# Patient Record
Sex: MALE | Race: WHITE | NOT HISPANIC OR LATINO | Employment: FULL TIME | URBAN - METROPOLITAN AREA
[De-identification: names, ages, dates, MRNs, and addresses within clinical notes are randomized per-mention and may not be internally consistent; named-entity substitution may affect disease eponyms.]

---

## 2018-10-04 ENCOUNTER — OFFICE VISIT (OUTPATIENT)
Dept: FAMILY MEDICINE | Facility: CLINIC | Age: 28
End: 2018-10-04
Payer: COMMERCIAL

## 2018-10-04 VITALS
TEMPERATURE: 97.9 F | DIASTOLIC BLOOD PRESSURE: 88 MMHG | WEIGHT: 130.6 LBS | SYSTOLIC BLOOD PRESSURE: 127 MMHG | RESPIRATION RATE: 16 BRPM | HEART RATE: 71 BPM | BODY MASS INDEX: 17.69 KG/M2 | HEIGHT: 72 IN | OXYGEN SATURATION: 100 %

## 2018-10-04 DIAGNOSIS — Z23 NEED FOR PROPHYLACTIC VACCINATION AND INOCULATION AGAINST INFLUENZA: ICD-10-CM

## 2018-10-04 DIAGNOSIS — Z23 NEED FOR TETANUS BOOSTER: ICD-10-CM

## 2018-10-04 DIAGNOSIS — R10.84 ABDOMINAL PAIN, GENERALIZED: Primary | ICD-10-CM

## 2018-10-04 PROCEDURE — 90472 IMMUNIZATION ADMIN EACH ADD: CPT | Performed by: FAMILY MEDICINE

## 2018-10-04 PROCEDURE — 90715 TDAP VACCINE 7 YRS/> IM: CPT | Performed by: FAMILY MEDICINE

## 2018-10-04 PROCEDURE — 90686 IIV4 VACC NO PRSV 0.5 ML IM: CPT | Performed by: FAMILY MEDICINE

## 2018-10-04 PROCEDURE — 90471 IMMUNIZATION ADMIN: CPT | Performed by: FAMILY MEDICINE

## 2018-10-04 PROCEDURE — 99203 OFFICE O/P NEW LOW 30 MIN: CPT | Mod: 25 | Performed by: FAMILY MEDICINE

## 2018-10-04 ASSESSMENT — ANXIETY QUESTIONNAIRES
2. NOT BEING ABLE TO STOP OR CONTROL WORRYING: NOT AT ALL
6. BECOMING EASILY ANNOYED OR IRRITABLE: MORE THAN HALF THE DAYS
3. WORRYING TOO MUCH ABOUT DIFFERENT THINGS: NOT AT ALL
7. FEELING AFRAID AS IF SOMETHING AWFUL MIGHT HAPPEN: SEVERAL DAYS
1. FEELING NERVOUS, ANXIOUS, OR ON EDGE: SEVERAL DAYS
GAD7 TOTAL SCORE: 5
5. BEING SO RESTLESS THAT IT IS HARD TO SIT STILL: SEVERAL DAYS
IF YOU CHECKED OFF ANY PROBLEMS ON THIS QUESTIONNAIRE, HOW DIFFICULT HAVE THESE PROBLEMS MADE IT FOR YOU TO DO YOUR WORK, TAKE CARE OF THINGS AT HOME, OR GET ALONG WITH OTHER PEOPLE: NOT DIFFICULT AT ALL

## 2018-10-04 ASSESSMENT — PATIENT HEALTH QUESTIONNAIRE - PHQ9: 5. POOR APPETITE OR OVEREATING: NOT AT ALL

## 2018-10-04 NOTE — PROGRESS NOTES
SUBJECTIVE:   Jonatan Worley is a 27 year old male who presents to clinic today for the following health issues:      ABDOMINAL PAIN     Onset: 4 days     Description:   Character: Dull ache and Cramping  Location: right lower quadrant left lower quadrant gal-umbilical region  Radiation: None    Intensity: moderate, 5/10    Progression of Symptoms:  same    Accompanying Signs & Symptoms:  Fever/Chills?: no   Gas/Bloating: YES  Nausea: YES  Vomitting: no   Diarrhea?: YES- 2-3.   Constipation:no   Dysuria or Hematuria: no    History:   Trauma: no   Previous similar pain: YES years ago had constipation problem   Previous tests done: none    Precipitating factors:   Does the pain change with:     Food: YES     BM: no     Urination: no     Alleviating factors:none       Therapies Tried and outcome:     LMP:  not applicable           Problem list and histories reviewed & adjusted, as indicated.  Additional history: as documented    Patient Active Problem List   Diagnosis     Urinary incontinence     Headache     Attention deficit disorder     Abdominal pain, generalized     History reviewed. No pertinent surgical history.    Social History   Substance Use Topics     Smoking status: Current Every Day Smoker     Packs/day: 0.50     Years: 1.00     Types: Cigarettes     Smokeless tobacco: Never Used      Comment: mom smokes too     Alcohol use No     History reviewed. No pertinent family history.        Reviewed and updated as needed this visit by clinical staff       Reviewed and updated as needed this visit by Provider         ROS:  Constitutional, HEENT,  pulmonary, gi and gu systems are negative, except as otherwise noted.    OBJECTIVE:     /88 (BP Location: Left arm, Patient Position: Chair, Cuff Size: Adult Regular)  Pulse 71  Temp 97.9  F (36.6  C) (Oral)  Resp 16  Ht 6' (1.829 m)  Wt 130 lb 9.6 oz (59.2 kg)  SpO2 100%  BMI 17.71 kg/m2  Body mass index is 17.71 kg/(m^2).  GENERAL: healthy, alert and  no distress  RESP: lungs clear to auscultation - no rales, rhonchi or wheezes  CV: regular rate and rhythm, normal S1 S2,  ABDOMEN: soft, bowel sounds normal, mild generalized soreness, no rebound tenderness or guarding   PSYCH: mentation appears normal and affect normal/bright    Diagnostic Test Results:  none     ASSESSMENT/PLAN:         1. Abdominal pain, generalized  - likely viral  - CBC with platelets; Future  - Basic metabolic panel; Future    2. Need for tetanus booster  - TDAP VACCINE (ADACEL)    3. Need for prophylactic vaccination and inoculation against influenza  - FLU VACCINE, SPLIT VIRUS, IM (QUADRIVALENT) [57228]- >3 YRS  - Vaccine Administration, Initial [84453]    See Patient Instructions    Jasmin Venegas MD  [facility]

## 2018-10-04 NOTE — LETTER
October 4, 2018      Jonatan Worley  [address] [facility] [address]        To Whom It May Concern:    Jonatan Worley  was seen on 10/4/18.  Please excuse him  until 10/5/18 due to illness.        Sincerely,          Dr. Theo MD

## 2018-10-04 NOTE — MR AVS SNAPSHOT
After Visit Summary   10/4/2018    Jonatan Worley    MRN: [id number]           Patient Information     Date Of Birth          1990        Visit Information        Provider Department      10/4/2018 9:00 AM Jasmin Venegas MD [facility]        Today's Diagnoses     Abdominal pain, generalized    -  1      Care Instructions      Recommend bland food for now  Increase fluids  Tylenol/ibuprofen as needed for pain   Follow up in 1 week or sooner if needed   Noninfectious Gastroenteritis (Adult)    Gastroenteritis can cause nausea, vomiting, diarrhea, and abdominal cramping. This may occur as a result of food sensitivity, inflammation of your gastrointestinal tract, medicines, stress, or other causes not related to infection. Your symptoms will usually last from 1 to 3 days, but can last longer. Antibiotics are not effective, but simple home treatment will be helpful.  Home care  Medicine    You may use acetaminophen or NSAID medicines like ibuprofen or naproxen to control fever, unless another medicine is prescribed. (Note: If you have chronic liver or kidney disease, or ever had a stomach ulcer or gastrointestinalI bleeding, talk with your healthcare provider before using these medicines.) Aspirin should never be used in anyone under 18 years of age who is ill with a fever. It may cause severe liver damage. Don't increase your NSAID medicines if you are already taking these medicines for another condition (like arthritis). Don't use NSAIDS if you are on aspirin (such as for heart disease, or after a stroke).    If medicines for diarrhea or vomiting are prescribed, take only as directed.  General care and preventing spread of the illness    If symptoms are severe, rest at home for the next 24 hours or until you feel better.    Hand washing with soap and water is the best way to prevent the spread of infection. Wash your hands after touching anyone who is sick.    Wash  your hands after using the toilet and before meals. Clean the toilet after each use.    Caffeine, tobacco, and alcohol can make your diarrhea, cramping, and pain worse.  Diet    Water and clear liquids are important so you do not get dehydrated. Drink a small amount at a time.    Do not force yourself to eat, especially if you have cramps, vomiting, or diarrhea. When you finally decide to start eating, do not eat large amounts at a time, even if you are hungry.    If you eat, avoid fatty, greasy, spicy, or fried foods.    Do not eat dairy products if you have diarrhea; they can make the diarrhea worse.  During the first 24 hours (the first full day), follow the diet below:    Beverages: Water, clear liquids, soft drinks without caffeine, like ginger ale; mineral water (plain or flavored); decaffeinated tea and coffee.    Soups: Clear broth, consommé, and bouillon Sports drinks aren't a good choice because they have too much sugar and not enough electrolytes. In this case, commercially available products called oral rehydration solutions are best.    Desserts: Plain gelatin, popsicles, and fruit juice bars.  During the next 24 hours (the second day), you may add the following to the above if you have improved. If not, continue what you did the first day:    Hot cereal, plain toast, bread, rolls, crackers    Plain noodles, rice, mashed potatoes, chicken noodle or rice soup    Unsweetened canned fruit (avoid pineapple), bananas    Limit caffeine and chocolate. No spices or seasonings except salt.  During the next 24 hours    Gradually resume a normal diet, as you feel better and your symptoms improve.    If at any time your symptoms start getting worse, go back to clear liquids until you feel better.  Food preparation    If you have diarrhea, you should not prepare food for others. When you  prepare food for yourself, wash your hands before and after.    Wash your hands after using cutting boards, countertops, and  knives that have been in contact with raw food.    Keep uncooked meats away from cooked and ready-to-eat foods.  Follow-up care  Follow up with your healthcare provider if you are not improving over the next 2 to 3 days, or as advised. If a stool (diarrhea) sample was taken, call for the results as directed.  When to seek medical advice  Call your healthcare provider right away if any of these occur:     Increasing abdominal pain or constant lower right abdominal pain    Continued vomiting (unable to keep liquids down)    Frequent diarrhea (more than 5 times a day)    Blood in vomit or stool (black or red color)    Inability to tolerate solid food after a few days.    Dark urine, reduced urine output    Weakness, dizziness    Drowsiness    Fever of 100.4 F (38.0 C) or higher, or as directed by your healthcare provider    New rash  Call 911  Call 911 if any of these occur:    Trouble breathing    Chest pain    Confusion    Severe drowsiness or trouble awakening    Seizure    Stiff neck  Date Last Reviewed: 11/16/2015 2000-2017 [employer]. [address] [place] [address]. All rights reserved. This information is not intended as a substitute for professional medical care. Always follow your healthcare professional's instructions.                Follow-ups after your visit        Follow-up notes from your care team     Return in about 1 week (around 10/11/2018).      Who to contact     If you have questions or need follow up information about today's clinic visit or your schedule please contact [facility] directly at [phone].  Normal or non-critical lab and imaging results will be communicated to you by MyChart, letter or phone within 4 business days after the clinic has received the results. If you do not hear from us within 7 days, please contact the clinic through MyChart or phone. If you have a critical or abnormal lab result, we will notify you by phone as soon as  "possible.  Submit refill requests through [employer] or call your pharmacy and they will forward the refill request to us. Please allow 3 business days for your refill to be completed.          Additional Information About Your Visit        [employer] Information     [employer] lets you send messages to your doctor, view your test results, renew your prescriptions, schedule appointments and more. To sign up, go to [email or web address] . Click on \"Log in\" on the left side of the screen, which will take you to the Welcome page. Then click on \"Sign up Now\" on the right side of the page.     You will be asked to enter the access code listed below, as well as some personal information. Please follow the directions to create your username and password.     Your access code is: [email or web address]  Expires: 2019  9:28 AM     Your access code will  in 90 days. If you need help or a new code, please call your [facility] clinic or [phone].        Care EveryWhere ID     This is your Care EveryWhere ID. This could be used by other organizations to access your [facility] medical records  [id number]        Your Vitals Were     Pulse Temperature Respirations Height Pulse Oximetry BMI (Body Mass Index)    71 97.9  F (36.6  C) (Oral) 16 6' (1.829 m) 100% 17.71 kg/m2       Blood Pressure from Last 3 Encounters:   10/04/18 127/88   11 112/76   11 110/60    Weight from Last 3 Encounters:   10/04/18 130 lb 9.6 oz (59.2 kg)   11 134 lb 14.4 oz (61.2 kg)   11 136 lb 8 oz (61.9 kg)              We Performed the Following     Basic metabolic panel     CBC with platelets        Primary Care Provider Office Phone # Fax #    Puneet Acosta [phone]       [address]  [address] [place] [id number]        Equal Access to Services     PRUDENCE ALBERT : Rigoberto fuller Soteto, waaxda luqadaha, qaybta jatin bradley. So [facility] " [phone].    ATENCIÓN: Si habla rowan, tiene a lau disposición servicios gratuitos de asistencia lingüística. Morenita al [phone].    We comply with applicable federal civil rights laws and [place] laws. We do not discriminate on the basis of race, color, national origin, age, disability, sex, sexual orientation, or gender identity.            Thank you!     Thank you for choosing [facility]  for your care. Our goal is always to provide you with excellent care. Hearing back from our patients is one way we can continue to improve our services. Please take a few minutes to complete the written survey that you may receive in the mail after your visit with us. Thank you!             Your Updated Medication List - Protect others around you: Learn how to safely use, store and throw away your medicines at www.disposemymeds.org.          This list is accurate as of 10/4/18  9:28 AM.  Always use your most recent med list.                   Brand Name Dispense Instructions for use Diagnosis    NO ACTIVE MEDICATIONS

## 2018-10-04 NOTE — PATIENT INSTRUCTIONS
Recommend bland food for now  Increase fluids  Tylenol/ibuprofen as needed for pain   Follow up in 1 week or sooner if needed   Noninfectious Gastroenteritis (Adult)    Gastroenteritis can cause nausea, vomiting, diarrhea, and abdominal cramping. This may occur as a result of food sensitivity, inflammation of your gastrointestinal tract, medicines, stress, or other causes not related to infection. Your symptoms will usually last from 1 to 3 days, but can last longer. Antibiotics are not effective, but simple home treatment will be helpful.  Home care  Medicine    You may use acetaminophen or NSAID medicines like ibuprofen or naproxen to control fever, unless another medicine is prescribed. (Note: If you have chronic liver or kidney disease, or ever had a stomach ulcer or gastrointestinalI bleeding, talk with your healthcare provider before using these medicines.) Aspirin should never be used in anyone under 18 years of age who is ill with a fever. It may cause severe liver damage. Don't increase your NSAID medicines if you are already taking these medicines for another condition (like arthritis). Don't use NSAIDS if you are on aspirin (such as for heart disease, or after a stroke).    If medicines for diarrhea or vomiting are prescribed, take only as directed.  General care and preventing spread of the illness    If symptoms are severe, rest at home for the next 24 hours or until you feel better.    Hand washing with soap and water is the best way to prevent the spread of infection. Wash your hands after touching anyone who is sick.    Wash your hands after using the toilet and before meals. Clean the toilet after each use.    Caffeine, tobacco, and alcohol can make your diarrhea, cramping, and pain worse.  Diet    Water and clear liquids are important so you do not get dehydrated. Drink a small amount at a time.    Do not force yourself to eat, especially if you have cramps, vomiting, or diarrhea. When you  finally decide to start eating, do not eat large amounts at a time, even if you are hungry.    If you eat, avoid fatty, greasy, spicy, or fried foods.    Do not eat dairy products if you have diarrhea; they can make the diarrhea worse.  During the first 24 hours (the first full day), follow the diet below:    Beverages: Water, clear liquids, soft drinks without caffeine, like ginger ale; mineral water (plain or flavored); decaffeinated tea and coffee.    Soups: Clear broth, consommé, and bouillon Sports drinks aren't a good choice because they have too much sugar and not enough electrolytes. In this case, commercially available products called oral rehydration solutions are best.    Desserts: Plain gelatin, popsicles, and fruit juice bars.  During the next 24 hours (the second day), you may add the following to the above if you have improved. If not, continue what you did the first day:    Hot cereal, plain toast, bread, rolls, crackers    Plain noodles, rice, mashed potatoes, chicken noodle or rice soup    Unsweetened canned fruit (avoid pineapple), bananas    Limit caffeine and chocolate. No spices or seasonings except salt.  During the next 24 hours    Gradually resume a normal diet, as you feel better and your symptoms improve.    If at any time your symptoms start getting worse, go back to clear liquids until you feel better.  Food preparation    If you have diarrhea, you should not prepare food for others. When you  prepare food for yourself, wash your hands before and after.    Wash your hands after using cutting boards, countertops, and knives that have been in contact with raw food.    Keep uncooked meats away from cooked and ready-to-eat foods.  Follow-up care  Follow up with your healthcare provider if you are not improving over the next 2 to 3 days, or as advised. If a stool (diarrhea) sample was taken, call for the results as directed.  When to seek medical advice  Call your healthcare provider right  away if any of these occur:     Increasing abdominal pain or constant lower right abdominal pain    Continued vomiting (unable to keep liquids down)    Frequent diarrhea (more than 5 times a day)    Blood in vomit or stool (black or red color)    Inability to tolerate solid food after a few days.    Dark urine, reduced urine output    Weakness, dizziness    Drowsiness    Fever of 100.4 F (38.0 C) or higher, or as directed by your healthcare provider    New rash  Call 911  Call 911 if any of these occur:    Trouble breathing    Chest pain    Confusion    Severe drowsiness or trouble awakening    Seizure    Stiff neck  Date Last Reviewed: 11/16/2015 2000-2017 [employer]. [address] [place] [address], [place] [address]. All rights reserved. This information is not intended as a substitute for professional medical care. Always follow your healthcare professional's instructions.

## 2018-10-04 NOTE — PROGRESS NOTES

## 2018-10-05 ASSESSMENT — ANXIETY QUESTIONNAIRES: GAD7 TOTAL SCORE: 5

## 2018-10-05 ASSESSMENT — PATIENT HEALTH QUESTIONNAIRE - PHQ9: SUM OF ALL RESPONSES TO PHQ QUESTIONS 1-9: 4

## 2019-07-28 ENCOUNTER — OFFICE VISIT (OUTPATIENT)
Dept: URGENT CARE | Facility: URGENT CARE | Age: 29
End: 2019-07-28
Payer: COMMERCIAL

## 2019-07-28 ENCOUNTER — ANCILLARY PROCEDURE (OUTPATIENT)
Dept: GENERAL RADIOLOGY | Facility: CLINIC | Age: 29
End: 2019-07-28
Attending: FAMILY MEDICINE
Payer: COMMERCIAL

## 2019-07-28 VITALS
HEIGHT: 71 IN | RESPIRATION RATE: 16 BRPM | TEMPERATURE: 98.6 F | WEIGHT: 135.4 LBS | HEART RATE: 78 BPM | BODY MASS INDEX: 18.96 KG/M2 | SYSTOLIC BLOOD PRESSURE: 121 MMHG | OXYGEN SATURATION: 98 % | DIASTOLIC BLOOD PRESSURE: 90 MMHG

## 2019-07-28 DIAGNOSIS — R10.13 ABDOMINAL PAIN, EPIGASTRIC: Primary | ICD-10-CM

## 2019-07-28 LAB
ALBUMIN SERPL-MCNC: 4.4 G/DL (ref 3.4–5)
ALBUMIN UR-MCNC: NEGATIVE MG/DL
ALP SERPL-CCNC: 86 U/L (ref 40–150)
ALT SERPL W P-5'-P-CCNC: 31 U/L (ref 0–70)
ANION GAP SERPL CALCULATED.3IONS-SCNC: 6 MMOL/L (ref 3–14)
APPEARANCE UR: CLEAR
AST SERPL W P-5'-P-CCNC: 19 U/L (ref 0–45)
BASOPHILS # BLD AUTO: 0 10E9/L (ref 0–0.2)
BASOPHILS NFR BLD AUTO: 0.4 %
BILIRUB SERPL-MCNC: 2 MG/DL (ref 0.2–1.3)
BILIRUB UR QL STRIP: ABNORMAL
BUN SERPL-MCNC: 14 MG/DL (ref 7–30)
CALCIUM SERPL-MCNC: 9.2 MG/DL (ref 8.5–10.1)
CHLORIDE SERPL-SCNC: 104 MMOL/L (ref 94–109)
CO2 SERPL-SCNC: 29 MMOL/L (ref 20–32)
COLOR UR AUTO: YELLOW
CREAT SERPL-MCNC: 0.71 MG/DL (ref 0.66–1.25)
CRP SERPL-MCNC: <2.9 MG/L (ref 0–8)
DIFFERENTIAL METHOD BLD: NORMAL
EOSINOPHIL # BLD AUTO: 0.1 10E9/L (ref 0–0.7)
EOSINOPHIL NFR BLD AUTO: 1.3 %
ERYTHROCYTE [DISTWIDTH] IN BLOOD BY AUTOMATED COUNT: 13.5 % (ref 10–15)
ERYTHROCYTE [SEDIMENTATION RATE] IN BLOOD BY WESTERGREN METHOD: 1 MM/H (ref 0–15)
GFR SERPL CREATININE-BSD FRML MDRD: >90 ML/MIN/{1.73_M2}
GLUCOSE SERPL-MCNC: 88 MG/DL (ref 70–99)
GLUCOSE UR STRIP-MCNC: NEGATIVE MG/DL
HCT VFR BLD AUTO: 48.4 % (ref 40–53)
HGB BLD-MCNC: 17 G/DL (ref 13.3–17.7)
HGB UR QL STRIP: NEGATIVE
KETONES UR STRIP-MCNC: 15 MG/DL
LEUKOCYTE ESTERASE UR QL STRIP: NEGATIVE
LIPASE SERPL-CCNC: 66 U/L (ref 73–393)
LYMPHOCYTES # BLD AUTO: 2.8 10E9/L (ref 0.8–5.3)
LYMPHOCYTES NFR BLD AUTO: 30.6 %
MCH RBC QN AUTO: 30.3 PG (ref 26.5–33)
MCHC RBC AUTO-ENTMCNC: 35.1 G/DL (ref 31.5–36.5)
MCV RBC AUTO: 86 FL (ref 78–100)
MONOCYTES # BLD AUTO: 0.8 10E9/L (ref 0–1.3)
MONOCYTES NFR BLD AUTO: 9.2 %
NEUTROPHILS # BLD AUTO: 5.3 10E9/L (ref 1.6–8.3)
NEUTROPHILS NFR BLD AUTO: 58.5 %
NITRATE UR QL: NEGATIVE
PH UR STRIP: 7 PH (ref 5–7)
PLATELET # BLD AUTO: 350 10E9/L (ref 150–450)
POTASSIUM SERPL-SCNC: 4.6 MMOL/L (ref 3.4–5.3)
PROT SERPL-MCNC: 7.7 G/DL (ref 6.8–8.8)
RBC # BLD AUTO: 5.61 10E12/L (ref 4.4–5.9)
SODIUM SERPL-SCNC: 139 MMOL/L (ref 133–144)
SOURCE: ABNORMAL
SP GR UR STRIP: 1.02 (ref 1–1.03)
UROBILINOGEN UR STRIP-ACNC: 4 EU/DL (ref 0.2–1)
WBC # BLD AUTO: 9.1 10E9/L (ref 4–11)

## 2019-07-28 PROCEDURE — 81003 URINALYSIS AUTO W/O SCOPE: CPT | Performed by: FAMILY MEDICINE

## 2019-07-28 PROCEDURE — 36415 COLL VENOUS BLD VENIPUNCTURE: CPT | Performed by: FAMILY MEDICINE

## 2019-07-28 PROCEDURE — 74019 RADEX ABDOMEN 2 VIEWS: CPT

## 2019-07-28 PROCEDURE — 99214 OFFICE O/P EST MOD 30 MIN: CPT | Performed by: FAMILY MEDICINE

## 2019-07-28 PROCEDURE — 85025 COMPLETE CBC W/AUTO DIFF WBC: CPT | Performed by: FAMILY MEDICINE

## 2019-07-28 PROCEDURE — 85652 RBC SED RATE AUTOMATED: CPT | Performed by: FAMILY MEDICINE

## 2019-07-28 PROCEDURE — 86140 C-REACTIVE PROTEIN: CPT | Performed by: FAMILY MEDICINE

## 2019-07-28 PROCEDURE — 83690 ASSAY OF LIPASE: CPT | Performed by: FAMILY MEDICINE

## 2019-07-28 PROCEDURE — 80053 COMPREHEN METABOLIC PANEL: CPT | Performed by: FAMILY MEDICINE

## 2019-07-28 RX ORDER — ONDANSETRON 4 MG/1
8 TABLET, ORALLY DISINTEGRATING ORAL ONCE
Status: COMPLETED | OUTPATIENT
Start: 2019-07-28 | End: 2019-07-28

## 2019-07-28 RX ORDER — ONDANSETRON 4 MG/1
4 TABLET, FILM COATED ORAL EVERY 8 HOURS PRN
Qty: 20 TABLET | Refills: 0 | Status: SHIPPED | OUTPATIENT
Start: 2019-07-28 | End: 2020-02-03

## 2019-07-28 RX ADMIN — ONDANSETRON 8 MG: 4 TABLET, ORALLY DISINTEGRATING ORAL at 10:47

## 2019-07-28 ASSESSMENT — MIFFLIN-ST. JEOR: SCORE: 1606.3

## 2019-07-28 NOTE — NURSING NOTE
Administrations This Visit     ondansetron (ZOFRAN-ODT) ODT tab 8 mg     Admin Date  07/28/2019 Action  Given Dose  8 mg Route  Oral Administered By  Princess TOMMY Miranda CMA              Clinic Administered Medication Documentation    Oral Medication Documentation    Patient was given Ondansetron (Zofran) . Prior to medication administration, verified patients identity using patient s name and date of birth. Please see MAR and medication order for additional information.     Was entire amount of medication used? Yes     Princess MILAN Miranda CMA

## 2019-07-28 NOTE — PROGRESS NOTES
"SUBJECTIVE:    Chief Complaint   Patient presents with     Abdominal Pain     x 2-3 days- nausea, vomiting and chills. No diarrhea     HPI:  Jonatan Worley is a 28 year old male who presents with the CC of abdominal - epigastric pain.    Pain is located in the epigastric and RUQ area, with radiation to None.  The pain is characterized as aching and cramping,   Pain at worst is a level 5 on a scale of 1-10.   Pain has been present for 2 day(s) and is fluctuating.  EXACERBATING FACTORS: eating  RELIEVING FACTORS: nothing.  ASSOCIATED SX: nausea, vomiting, chills and \"head foggy\".  Patient denies diarrhea, constipation, bloating, melena, hematochezia, dysuria, frequency, fever and myalgias     Last time the patient passed stool- yesterday, formed  Last time the patient urinated- today, no dysuria-  Yesterday urinated about 4 x  Last time the patient was eating/ drinking -today, little    Surgical History :      Appendectomy   No  cholecystectomy    No   Colon or bowel surgery -  No  Diverticulitis history -   No    He reports occasional drinking beer, not daily.  On high consumption days says he drinks 3 beers       History reviewed. No pertinent past medical history.  Patient Active Problem List   Diagnosis     Urinary incontinence     Headache     Attention deficit disorder     Abdominal pain, generalized       ALLERGIES:  No known allergies      Current Outpatient Medications on File Prior to Visit:  NO ACTIVE MEDICATIONS      No current facility-administered medications on file prior to visit.     Social History     Tobacco Use     Smoking status: Current Every Day Smoker     Packs/day: 0.50     Years: 1.00     Pack years: 0.50     Types: Cigarettes     Smokeless tobacco: Never Used     Tobacco comment: mom smokes too   Substance Use Topics     Alcohol use: No       History reviewed. No pertinent family history.      ROS:  CONSTITUTIONAL:NEGATIVE for fever,  Some chills,   INTEGUMENTARY/SKIN: NEGATIVE for " "worrisome rashes,  or lesions  EYES: NEGATIVE for vision changes or irritation  ENT/MOUTH: NEGATIVE for ear, mouth and throat problems  RESP:NEGATIVE for significant cough or SOB    OBJECTIVE:  /90 (BP Location: Right arm, Patient Position: Sitting, Cuff Size: Adult Regular)   Pulse 78   Temp 98.6  F (37  C) (Tympanic)   Resp 16   Ht 1.803 m (5' 11\")   Wt 61.4 kg (135 lb 6.4 oz)   SpO2 98%   BMI 18.88 kg/m    GENERAL APPEARANCE: alert, moderate distress and cooperative  EYES: EOMI,  PERRL, conjunctiva clear  HENT: ear canals and TM's normal.  Nose and mouth without ulcers, erythema or lesions  NECK: supple, nontender, no lymphadenopathy  RESP: lungs clear to auscultation - no rales, rhonchi or wheezes  CV: regular rates and rhythm, normal S1 S2, no murmur noted  ABDOMEN: soft, normal bowel sounds, tenderness moderate epigastric, mild RUQ  NEURO: Normal strength and tone, sensory exam grossly normal,  normal speech and mentation  SKIN: no suspicious lesions or rashes      Labs:    Results for orders placed or performed in visit on 07/28/19   CBC with platelets differential   Result Value Ref Range    WBC 9.1 4.0 - 11.0 10e9/L    RBC Count 5.61 4.4 - 5.9 10e12/L    Hemoglobin 17.0 13.3 - 17.7 g/dL    Hematocrit 48.4 40.0 - 53.0 %    MCV 86 78 - 100 fl    MCH 30.3 26.5 - 33.0 pg    MCHC 35.1 31.5 - 36.5 g/dL    RDW 13.5 10.0 - 15.0 %    Platelet Count 350 150 - 450 10e9/L    Diff Method Automated Method     % Neutrophils 58.5 %    % Lymphocytes 30.6 %    % Monocytes 9.2 %    % Eosinophils 1.3 %    % Basophils 0.4 %    Absolute Neutrophil 5.3 1.6 - 8.3 10e9/L    Absolute Lymphocytes 2.8 0.8 - 5.3 10e9/L    Absolute Monocytes 0.8 0.0 - 1.3 10e9/L    Absolute Eosinophils 0.1 0.0 - 0.7 10e9/L    Absolute Basophils 0.0 0.0 - 0.2 10e9/L   UA reflex to Microscopic and Culture   Result Value Ref Range    Color Urine Yellow     Appearance Urine Clear     Glucose Urine Negative NEG^Negative mg/dL    Bilirubin Urine " Small (A) NEG^Negative    Ketones Urine 15 (A) NEG^Negative mg/dL    Specific Gravity Urine 1.020 1.003 - 1.035    Blood Urine Negative NEG^Negative    pH Urine 7.0 5.0 - 7.0 pH    Protein Albumin Urine Negative NEG^Negative mg/dL    Urobilinogen Urine 4.0 (H) 0.2 - 1.0 EU/dL    Nitrite Urine Negative NEG^Negative    Leukocyte Esterase Urine Negative NEG^Negative    Source Midstream Urine    Erythrocyte sedimentation rate auto   Result Value Ref Range    Sed Rate 1 0 - 15 mm/h           Abdominal x-ray: No obstruction, no free air,  No dilated bowel,  moderate amount of stool   x-ray read by me Keisha Reinoso MD      ASSESSMENT:     Abdominal pain, epigastric     - Comprehensive metabolic panel  - Lipase  - XR Abdomen 2 Views  - CBC with platelets differential  - UA reflex to Microscopic and Culture  - Erythrocyte sedimentation rate auto  - CRP inflammation  - ondansetron (ZOFRAN) 4 MG tablet; Take 1 tablet (4 mg) by mouth every 8 hours as needed for nausea  - ranitidine (ZANTAC) 150 MG tablet; Take 1 tablet (150 mg) by mouth 2 times daily-  discussed that improvement with ranitidine would likely be associated with gastritis, or start of stomach ulcer      We discussed some of the many possible causes of epigastric abdominal pain including appendicitis, cholecystitis, diverticulitis, hepatitis, colitis, gastritis,  A stone in the gallbladder, pancreas, kidney or ureter, constipation, obstructed bowel, kidney or bladder infection, cancers, UTI,   hernias, torsion of an organ,       Discussed that the lab results did not identify the cause of the abdominal pain, but the testing has confirmed that  He/she does not have some important causes of pain  Labs indicate unlikely urinary tract/ kidney infection or stone,  There is unlikely significant intraabdominal infection like appendicitis, cholecystitis or diverticulitis,  no bowel obstruction, no significant constipation.  Testing for inflammation of liver, gallbladder  and pancreas is pending.     Patient was counselled that if the abdominal symptoms worsen, especially with worsening pain, associated fever, chills, and/or vomiting with inability to keep down foods and liquids, blood in the urine, stool or vomitus  that they should be re-evaluated in the Emergency Department.       Given zofran 8 mg in clinic with some improvement of nausea symptoms

## 2019-07-28 NOTE — PATIENT INSTRUCTIONS
Patient Education     Food Poisoning or Viral Gastroenteritis (Adult)  You have a stomach illness that is likely either food poisoning or viral gastroenteritis.  Food poisoning is illness that is passed along in food and affects the stomach and intestinal tract. It usually occurs from 1 to 24 hours after eating food that has spoiled. When it happens within a few hours of eating, it is often caused by toxins from bacteria in food that has not been cooked or refrigerated properly.  Viral gastroenteritis is an illness from a virus that also affects the stomach and intestinal tract. Many people call it the  stomach flu,  but it has nothing to do with influenza. In fact, it can happen from food poisoning, but it can also happen when germs are passed from person-to-person or contaminated surface (toothbrush, cutting board, toilet) to a person.  Either illness can cause these symptoms:    Abdominal pain and cramping    Nausea    Vomiting    Diarrhea    Fever and chills    Loss of bowel control  The symptoms of food poisoning usually last 1 to 2 days. The symptoms of viral gastroenteritis can sometimes last up to 7 days but usually end sooner.  Antibiotics are not effective for either illness.  Other causes of gastroenteritis include bacteria and parasites which are not discussed here.  Home care  Follow all instructions given by your healthcare provider. Rest at home for the next 24 hours, or until you feel better. Avoid caffeine, tobacco, and alcohol. These can make diarrhea, cramping, and pain worse.  If taking medicines:    Over-the-counter diarrhea or nausea medicines are generally OK unless you have bleeding, fever, or severe abdominal pain.    You may use acetaminophen or NSAID medicines like ibuprofen or naproxen to reduce pain and fever. Don t use these if you have chronic liver or kidney disease, or ever had a stomach ulcer or gastrointestinal bleeding. Talk with your healthcare provider first. Don't use NSAID  medicines if you are already taking one for another condition (like arthritis) or are on daily aspirin therapy (such as for heart disease or after a stroke).  To prevent the spread of illness:    Remember that washing with soap and water is the best way to prevent the spread of infection. Wash your hands before and after caring for a sick person. Dry your hands with a single use towel.    Clean the toilet after each use.    Wash your hands or use alcohol-based hand  before eating.    Wash your hands or use alcohol-based hand  before and after preparing food. Keep in mind that people with diarrhea or vomiting should not prepare food for others.    Wash your hands or use alcohol-based hand  after using cutting boards, counter-tops, and knives (and other utensils) that have been in contact with raw foods.    Wash and then peel fruits and vegetables.    Keep uncooked meats away from cooked and ready-to-eat foods.    Use a food thermometer when cooking. Cook poultry to at least 165 F (74 C). Cook ground meat (beef, veal, pork, lamb) to at least 160 F (71 C). Cook fresh beef, veal, lamb, and pork to at least 145 F (63 C).    Don t eat raw or undercooked eggs (poached or kayden side up), poultry, meat or unpasteurized milk and juices.  Food and drinks  The main goal while treating vomiting or diarrhea is to prevent dehydration. This is done by taking small amounts of liquids often.    Keep in mind that liquids are more important than food right now.    Drink only small amounts of liquids at a time.    Don t force yourself to eat, especially if you are having cramping, vomiting, or diarrhea. Don t eat large amounts at a time, even if you are hungry.    If you eat, avoid fatty, greasy, spicy, or fried foods.    Don t eat dairy foods or drink milk if you have diarrhea. These can make diarrhea worse.  The first 24 hours you can try:    Oral rehydration solutions, available at grocery stores and  pharmacies. Sports drinks are not a good choice if you are very dehydrated. They have too much sugar and not enough electrolytes.    Soft drinks without caffeine    Ginger ale    Water (plain or flavored)    Decaf tea or coffee    Clear broth, consommé, or bouillon    Gelatin, ice pops, or frozen fruit juice bars   The second 24 hours, if you are feeling better, you can add:    Hot cereal, plain toast, bread, rolls, or crackers    Plain noodles, rice, mashed potatoes, chicken noodle soup, or rice soup    Unsweetened canned fruit (no pineapple)    Bananas  As you recover:    Limit fat intake to less than 15 grams per day. Don t eat margarine, butter, oils, mayonnaise, sauces, gravies, fried foods, peanut butter, meat, poultry, or fish.    Limit fiber. Don t eat raw or cooked vegetables, fresh fruits except bananas, and bran cereals.    Limit caffeine and chocolate.    Don t use spices or seasonings except salt.    Resume a normal diet over time, as you feel better and your symptoms improve.    If the symptoms come back, go back to a simple diet or clear liquids.  Follow-up care  Follow up with your healthcare provider, or as advised. If a stool sample was taken or cultures were done, call the healthcare provider for the results as instructed.  Call 911  Call 911 if you have any of these symptoms:    Trouble breathing    Confusion    Extreme drowsiness or trouble walking    Loss of consciousness    Rapid heart rate    Chest pain    Stiff neck    Seizure  When to seek medical advice  Call your healthcare provider right away if any of these occur:    Abdominal pain that gets worse    Constant lower right abdominal pain    Continued vomiting and inability to keep liquids down    Diarrhea more than 5 times a day    Blood in vomit or stool    Dark urine or no urine for 8 hours, dry mouth and tongue, tiredness, weakness, or dizziness    New rash    You don t get better in 2 to 3 days    Fever of 100.4 F (38 C) or higher, or  as directed by your healthcare provider    You have new symptoms of arthritis  Date Last Reviewed: 6/1/2018 2000-2018 [employer]. [address] [place] [address], [place] [address]. All rights reserved. This information is not intended as a substitute for professional medical care. Always follow your healthcare professional's instructions.

## 2019-07-28 NOTE — LETTER
[facility] URGENT [facility]  [address]  [facility] [address]  [phone]      July 28, 2019    RE:  Jonatan Worley                                                                                                                                                       [address] [facility] [address]            To whom it may concern:    Jonatan Worley is under my professional care for Abdominal pain, epigastric.    May return to work   with no restrictions when vomiting, abdominal pain,  fevers, chills and weakness have resolved        Sincerely,        Keisha Reinoso MD  [facility]

## 2020-01-08 ENCOUNTER — ANCILLARY PROCEDURE (OUTPATIENT)
Dept: GENERAL RADIOLOGY | Facility: CLINIC | Age: 30
End: 2020-01-08
Attending: PHYSICIAN ASSISTANT
Payer: COMMERCIAL

## 2020-01-08 ENCOUNTER — OFFICE VISIT (OUTPATIENT)
Dept: URGENT CARE | Facility: URGENT CARE | Age: 30
End: 2020-01-08
Payer: COMMERCIAL

## 2020-01-08 VITALS
SYSTOLIC BLOOD PRESSURE: 122 MMHG | TEMPERATURE: 98.2 F | HEART RATE: 74 BPM | DIASTOLIC BLOOD PRESSURE: 80 MMHG | BODY MASS INDEX: 19.94 KG/M2 | OXYGEN SATURATION: 97 % | WEIGHT: 143 LBS

## 2020-01-08 DIAGNOSIS — R07.81 RIB PAIN ON LEFT SIDE: ICD-10-CM

## 2020-01-08 DIAGNOSIS — R07.81 RIB PAIN ON LEFT SIDE: Primary | ICD-10-CM

## 2020-01-08 PROCEDURE — 99214 OFFICE O/P EST MOD 30 MIN: CPT | Performed by: PHYSICIAN ASSISTANT

## 2020-01-08 PROCEDURE — 71101 X-RAY EXAM UNILAT RIBS/CHEST: CPT | Mod: LT

## 2020-01-08 RX ORDER — CYCLOBENZAPRINE HCL 10 MG
10 TABLET ORAL 3 TIMES DAILY PRN
Qty: 30 TABLET | Refills: 0 | Status: SHIPPED | OUTPATIENT
Start: 2020-01-08 | End: 2020-02-03

## 2020-01-08 NOTE — PATIENT INSTRUCTIONS
Ibuprofen  800mg three times a day for 3-5 days      Patient Education     Chest Wall Pain: Costochondritis    The chest pain that you have had today is caused by costochondritis. This condition is caused by an inflammation of the cartilage joining your ribs to your breastbone. It is not caused by heart or lung problems. Your healthcare team has made sure that the chest pain you feel is not from a life threatening cause of chest pain such as heart attack, collapsed lung, blood clot in the lung, tear in the aorta, or esophageal rupture. The inflammation may have been brought on by a blow to the chest, lifting heavy objects, intense exercise, or an illness that made you cough and sneeze a lot. It often occurs during times of emotional stress. It can be painful, but it is not dangerous. It usually goes away in 1 to 2 weeks. But it may happen again. Rarely, a more serious condition may cause symptoms similar to costochondritis. That s why it s important to watch for the warning signs listed below.  Home care  Follow these guidelines when caring for yourself at home:    If you feel that emotional stress is a cause of your condition, try to figure out the sources of that stress. It may not be obvious. Learn ways to deal with the stress in your life. This can include regular exercise, muscle relaxation, meditation, or simply taking time out for yourself.    You may use acetaminophen, ibuprofen, or naproxen to control pain, unless another pain medicine was prescribed. If you have liver or kidney disease or ever had a stomach ulcer, talk with your healthcare provider before using these medicines.    You can also help ease pain by using a hot, wet compress or heating pad. Use this with or without a medicated skin cream that helps relieves pain.    Do stretching exercise as advised by your provider.    Take any prescribed medicines as directed.  Follow-up care  Follow up with your healthcare provider, or as advised, if you do  not start to get better in the next 2 days.  When to seek medical advice  Call your healthcare provider right away if any of these occur:    A change in the type of pain. Call if it feels different, becomes more serious, lasts longer, or spreads into your shoulder, arm, neck, jaw, or back.    Shortness of breath or pain gets worse when you breathe    Weakness, dizziness, or fainting    Cough with dark-colored sputum (phlegm) or blood    Abdominal pain    Dark red or black stools    Fever of 100.4 F (38 C) or higher, or as directed by your healthcare provider  Date Last Reviewed: 12/1/2016 2000-2019 [employer]. [address] [place] [address], [place] [address]. All rights reserved. This information is not intended as a substitute for professional medical care. Always follow your healthcare professional's instructions.

## 2020-01-08 NOTE — PROGRESS NOTES
SUBJECTIVE:  Chief Complaint   Patient presents with     Urgent Care     Chest Pain     Pain on the Left side of upper rib cage- he felt a popping sensation when he was leaning over something last week      Jonatan Worley is a 29 year old male presents with a chief complaint of left rib pain.  The injury occurred 1 week(s) ago.   The injury happened while at home. How: twisting/reaching immediate pain.  The patient complained of moderate pain  and has had decreased ROM.  Pain exacerbated by twisting.  Relieved by rest.  He treated it initially with Ibuprofen. This is the first time this type of injury has occurred to this patient.     No past medical history on file.  Current Outpatient Medications   Medication Sig Dispense Refill     NO ACTIVE MEDICATIONS        ondansetron (ZOFRAN) 4 MG tablet Take 1 tablet (4 mg) by mouth every 8 hours as needed for nausea (Patient not taking: Reported on 1/8/2020) 20 tablet 0     ranitidine (ZANTAC) 150 MG tablet Take 1 tablet (150 mg) by mouth 2 times daily (Patient not taking: Reported on 1/8/2020) 60 tablet 0     Social History     Tobacco Use     Smoking status: Current Every Day Smoker     Packs/day: 0.50     Years: 1.00     Pack years: 0.50     Types: Cigarettes     Smokeless tobacco: Never Used     Tobacco comment: mom smokes too   Substance Use Topics     Alcohol use: No       ROS:  10 point ROS negative except as listed above      EXAM:   /80   Pulse 74   Temp 98.2  F (36.8  C) (Tympanic)   Wt 64.9 kg (143 lb)   SpO2 97%   BMI 19.94 kg/m    Gen: healthy,alert,no distress  RIBS: Pain with palapation, rotation, cross arm pull  CHEST: clear to auscultation  CV: regular rate and rhythm  EXTREMITIES: peripheral pulses normal  MS: no gross deformities noted, no evidence of inflammation in joints, FROM in all extremities.  SKIN: no suspicious lesions or rashes  NEURO: Normal strength and tone, sensory exam grossly normal, mentation intact and speech  normal    X-ray image initially interpreted in clinic by me in order to rule out pneumothorax, fracture, dislocation.  No evidence appreciated.        ASSESSMENT:   (R07.81) Rib pain on left side  (primary encounter diagnosis)  Comment: No evidence of pneumothorax, fracture, dislocation appreciated  Plan: XR Ribs & Chest Left G/E 3 Views,         cyclobenzaprine (FLEXERIL) 10 MG tablet  Rest, Ice, scheduled deep breaths, Ibuprofen    Patient Instructions   Ibuprofen  800mg three times a day for 3-5 days      Patient Education     Chest Wall Pain: Costochondritis    The chest pain that you have had today is caused by costochondritis. This condition is caused by an inflammation of the cartilage joining your ribs to your breastbone. It is not caused by heart or lung problems. Your healthcare team has made sure that the chest pain you feel is not from a life threatening cause of chest pain such as heart attack, collapsed lung, blood clot in the lung, tear in the aorta, or esophageal rupture. The inflammation may have been brought on by a blow to the chest, lifting heavy objects, intense exercise, or an illness that made you cough and sneeze a lot. It often occurs during times of emotional stress. It can be painful, but it is not dangerous. It usually goes away in 1 to 2 weeks. But it may happen again. Rarely, a more serious condition may cause symptoms similar to costochondritis. That s why it s important to watch for the warning signs listed below.  Home care  Follow these guidelines when caring for yourself at home:    If you feel that emotional stress is a cause of your condition, try to figure out the sources of that stress. It may not be obvious. Learn ways to deal with the stress in your life. This can include regular exercise, muscle relaxation, meditation, or simply taking time out for yourself.    You may use acetaminophen, ibuprofen, or naproxen to control pain, unless another pain medicine was prescribed. If  you have liver or kidney disease or ever had a stomach ulcer, talk with your healthcare provider before using these medicines.    You can also help ease pain by using a hot, wet compress or heating pad. Use this with or without a medicated skin cream that helps relieves pain.    Do stretching exercise as advised by your provider.    Take any prescribed medicines as directed.  Follow-up care  Follow up with your healthcare provider, or as advised, if you do not start to get better in the next 2 days.  When to seek medical advice  Call your healthcare provider right away if any of these occur:    A change in the type of pain. Call if it feels different, becomes more serious, lasts longer, or spreads into your shoulder, arm, neck, jaw, or back.    Shortness of breath or pain gets worse when you breathe    Weakness, dizziness, or fainting    Cough with dark-colored sputum (phlegm) or blood    Abdominal pain    Dark red or black stools    Fever of 100.4 F (38 C) or higher, or as directed by your healthcare provider  Date Last Reviewed: 12/1/2016 2000-2019 [employer]. [address] [place] [address], [place] [address]. All rights reserved. This information is not intended as a substitute for professional medical care. Always follow your healthcare professional's instructions.

## 2020-01-08 NOTE — LETTER
[facility] URGENT [facility]  [address]  [address]  [place] [address]  Phone: [phone]  Fax: [phone]    January 8, 2020        Jonatan Worley  [address] [facility] [address]          To whom it may concern:    RE: Jonatan Munroevinodjhon    Patient was seen and treated today at our clinic.  Please excuse absences on 1/5, 1/6, and 1/11.      Please contact me for questions or concerns.      Sincerely,        Juvenal Cole PA-C

## 2020-02-03 ENCOUNTER — OFFICE VISIT (OUTPATIENT)
Dept: URGENT CARE | Facility: URGENT CARE | Age: 30
End: 2020-02-03
Payer: COMMERCIAL

## 2020-02-03 VITALS
SYSTOLIC BLOOD PRESSURE: 120 MMHG | OXYGEN SATURATION: 98 % | HEART RATE: 85 BPM | WEIGHT: 140 LBS | DIASTOLIC BLOOD PRESSURE: 80 MMHG | TEMPERATURE: 97.7 F | BODY MASS INDEX: 19.53 KG/M2

## 2020-02-03 DIAGNOSIS — H65.91 MIDDLE EAR EFFUSION, RIGHT: Primary | ICD-10-CM

## 2020-02-03 PROCEDURE — 99213 OFFICE O/P EST LOW 20 MIN: CPT | Performed by: PHYSICIAN ASSISTANT

## 2020-02-03 RX ORDER — GENTAMICIN SULFATE 3 MG/ML
SOLUTION/ DROPS OPHTHALMIC
COMMUNITY
Start: 2020-02-01

## 2020-02-03 RX ORDER — FLUTICASONE PROPIONATE 50 MCG
1 SPRAY, SUSPENSION (ML) NASAL DAILY
Qty: 16 G | Refills: 0 | Status: SHIPPED | OUTPATIENT
Start: 2020-02-03

## 2020-02-03 ASSESSMENT — PAIN SCALES - GENERAL: PAINLEVEL: NO PAIN (0)

## 2020-02-03 NOTE — PROGRESS NOTES
SUBJECTIVE:  Jonatan Worley is a 29 year old male who presents with right ear muffled for 1 day(s).   Severity: moderate   Timing:sudden onset and still present  Patient works around loud noises and wears ear plugs. He has had this problem in the past. He reports that symptoms in the past and they have resolved without intervention in the past.   Patient denies fever, chills, drainage from ears, tinnitus, pain on the outer ear, recent URI symptoms or recent swimming.       No past medical history on file.  Current Outpatient Medications   Medication Sig Dispense Refill     fluticasone (FLONASE) 50 MCG/ACT nasal spray Spray 1 spray into both nostrils daily 16 g 0     gentamicin (GARAMYCIN) 0.3 % ophthalmic solution PLACE 1 DROP INTO THE RIGHT EYE TID       NO ACTIVE MEDICATIONS        Social History     Tobacco Use     Smoking status: Current Every Day Smoker     Packs/day: 0.50     Years: 1.00     Pack years: 0.50     Types: Cigarettes     Smokeless tobacco: Never Used     Tobacco comment: mom smokes too   Substance Use Topics     Alcohol use: No       ROS:   Review of systems negative except as stated above.    OBJECTIVE:  /80   Pulse 85   Temp 97.7  F (36.5  C) (Tympanic)   Wt 63.5 kg (140 lb)   SpO2 98%   BMI 19.53 kg/m     EXAM:  The right TM is fluid     The right auditory canal is normal and without drainage, edema or erythema  The left TM is normal: no effusions, no erythema, and normal landmarks  The left auditory canal is normal and without drainage, edema or erythema  Oropharynx exam is normal: no lesions, erythema, adenopathy or exudate.  GENERAL: no acute distress  EYES: EOMI,  PERRL, conjunctiva clear  NECK: supple, non-tender to palpation, no adenopathy noted  RESP: lungs clear to auscultation - no rales, rhonchi or wheezes  CV: regular rates and rhythm, normal S1 S2, no murmur noted  SKIN: no suspicious lesions or rashes     ASSESSMENT / PLAN:  1. Middle ear effusion, right  Small amount  of fluid in right ear, wll use flonase  No evidence of AOM. OE or cerumen impaction  Advised to follow-up with audiology if sxs worsen or fail to resolve in one week.   - fluticasone (FLONASE) 50 MCG/ACT nasal spray; Spray 1 spray into both nostrils daily  Dispense: 16 g; Refill: 0    Diagnosis and treatment plan was reviewed with patient and/or family.   We went over any labs or imaging. Discussed worsening symptoms or little to no relief despite treatment plan to follow-up with PCP or return to clinic.  Patient verbalizes understanding. All questions were addressed and answered.   Kavita Alfaro PA-C

## 2021-01-15 ENCOUNTER — HEALTH MAINTENANCE LETTER (OUTPATIENT)
Age: 31
End: 2021-01-15

## 2021-10-24 ENCOUNTER — HEALTH MAINTENANCE LETTER (OUTPATIENT)
Age: 31
End: 2021-10-24

## 2021-11-04 ENCOUNTER — OFFICE VISIT (OUTPATIENT)
Dept: URGENT CARE | Facility: URGENT CARE | Age: 31
End: 2021-11-04
Payer: COMMERCIAL

## 2021-11-04 VITALS
TEMPERATURE: 97.1 F | HEART RATE: 84 BPM | SYSTOLIC BLOOD PRESSURE: 140 MMHG | OXYGEN SATURATION: 99 % | DIASTOLIC BLOOD PRESSURE: 92 MMHG

## 2021-11-04 DIAGNOSIS — R07.0 THROAT PAIN: Primary | ICD-10-CM

## 2021-11-04 LAB
DEPRECATED S PYO AG THROAT QL EIA: NEGATIVE
GROUP A STREP BY PCR: NOT DETECTED

## 2021-11-04 PROCEDURE — U0005 INFEC AGEN DETEC AMPLI PROBE: HCPCS | Performed by: PHYSICIAN ASSISTANT

## 2021-11-04 PROCEDURE — U0003 INFECTIOUS AGENT DETECTION BY NUCLEIC ACID (DNA OR RNA); SEVERE ACUTE RESPIRATORY SYNDROME CORONAVIRUS 2 (SARS-COV-2) (CORONAVIRUS DISEASE [COVID-19]), AMPLIFIED PROBE TECHNIQUE, MAKING USE OF HIGH THROUGHPUT TECHNOLOGIES AS DESCRIBED BY CMS-2020-01-R: HCPCS | Performed by: PHYSICIAN ASSISTANT

## 2021-11-04 PROCEDURE — 99213 OFFICE O/P EST LOW 20 MIN: CPT | Performed by: PHYSICIAN ASSISTANT

## 2021-11-04 PROCEDURE — 87651 STREP A DNA AMP PROBE: CPT | Performed by: PHYSICIAN ASSISTANT

## 2021-11-04 NOTE — PROGRESS NOTES
Assessment/Plan:    No acute distress or toxicity noted. Lungs CTAB, no signs of pneumonia. Strep negative. Suspect viral illness. Supportive treatments discussed.    Discussed that symptoms do overlap with possible COVID-19, and patient was tested for this today. Isolate while awaiting test results.     PPE worn during exam: face shield, surgical mask, gown, & gloves.  See patient instructions below.  At the end of the encounter, I discussed results, diagnosis, medications. Discussed red flags for immediate return to clinic/ER, as well as indications for follow up if no improvement. Patient understood and agreed to plan. Patient was stable for discharge.      ICD-10-CM    1. Throat pain  R07.0 Streptococcus A Rapid Screen w/Reflex to PCR     Group A Streptococcus PCR Throat Swab     Symptomatic COVID-19 Virus (Coronavirus) by PCR Nose         Return in about 1 week (around 11/11/2021) for Follow up w/ primary care provider if not better.    LIDYA England, ROBINA  [facility] [place]    ------------------------------------------------------------------------------------------------------------------------------------------------------------------------  HPI:  Jonatan Worley is a 30 year old male who presents for evaluation of sore throat, rhinorrhea, fatigue and myalgias onset 2 days ago. No treatments tried. Patient reports no fever/chills, cough, loss of sense of taste or smell, headache, chest pain, shortness of breath, abdominal pain, nausea, vomiting, diarrhea, rash, or any other symptoms. No known sick contacts/COVID exposure.       No past medical history on file.    Vitals:    11/04/21 1119   BP: (!) 140/92   Pulse: 84   Temp: 97.1  F (36.2  C)   SpO2: 99%       Physical Exam  Vitals and nursing note reviewed.   HENT:      Right Ear: Tympanic membrane normal.      Left Ear: Tympanic membrane normal.      Mouth/Throat:      Mouth: Mucous membranes are moist.      Pharynx: Uvula  midline. Posterior oropharyngeal erythema present.      Tonsils: No tonsillar exudate or tonsillar abscesses.   Cardiovascular:      Rate and Rhythm: Normal rate and regular rhythm.      Heart sounds: Normal heart sounds.   Pulmonary:      Effort: Pulmonary effort is normal.      Breath sounds: Normal breath sounds.   Neurological:      Mental Status: He is alert.         Labs/Imaging:  Results for orders placed or performed in visit on 11/04/21 (from the past 24 hour(s))   Streptococcus A Rapid Screen w/Reflex to PCR    Specimen: Throat; Swab   Result Value Ref Range    Group A Strep antigen Negative Negative         Patient Instructions     Adult Self-Care for Colds  Colds are caused by viruses. They can t be cured with antibiotics. However, you can relieve symptoms and support your body s efforts to heal itself. No matter which symptoms you have, be sure to drink plenty of fluids (water or clear soup); stop smoking and drinking alcohol; and get plenty of rest.      Understand a fever    Take your temperature several times a day. If your fever is 100.4 F (38.0 C) for more than a day, call your doctor.    Relax, lie down. Go to bed if you want. Just get off your feet and rest. Also, drink plenty of fluids to avoid dehydration.    Take acetaminophen or a nonsteroidal anti-inflammatory agent (NSAID), such as ibuprofen.  Treat a troubled nose kindly    Breathe steam or heated humidified air to open blocked nasal passages.  a hot shower or use a vaporizer. Be careful not to get burned by the steam.    Saline nasal sprays and Mucinex help open a stuffy nose. Antihistamines can also help, but they can cause side effects such as drowsiness and drying of the eyes, nose, and mouth.  Soothe a sore throat and cough    Gargle every 2 hours with 1/4 teaspoon of salt dissolved in 1/2 cup of warm water. Suck on throat lozenges and cough drops to moisten your throat (those containing menthol work best).    Cough medicines  are available but it is unclear how effective they actually are.    Try honey for cough    Take acetaminophen or an NSAID, such as ibuprofen to ease throat pain  Ease digestive problems    Put fluid back into your body. Take frequent sips of clear liquids such as water or broth. Do not drink beverages with a lot of sugar in them, such as juices and sodas. These can make diarrhea worse. Older children and adults can drink sports drinks.    As your appetite returns, you can resume your normal diet. Ask your doctor whether there are any foods you should avoid.  When to seek medical care  When you first notice symptoms, ask your health care provider if antiviral medications are appropriate. Antibiotics should not be taken for colds or flu. Also, call your doctor if you have any of the following symptoms or if you aren t feeling better after 7 days:    Shortness of breath    Pain or pressure in the chest or abdomen    Worsening symptoms, especially after a period of improvement    Fever of 100.4 F  (38.0 C) or higher, or fever that doesn t go down with medication    Sudden dizziness or confusion    Severe or continued vomiting    Signs of dehydration, including extreme thirst, dark urine, infrequent urination, dry mouth    Spotted, red, or very sore throat      2209-9691 [employer]. [address] [place] [address], [place] [address]. All rights reserved. This information is not intended as a substitute for professional medical care. Always follow your healthcare professional's instructions.

## 2021-11-04 NOTE — PATIENT INSTRUCTIONS
Adult Self-Care for Colds  Colds are caused by viruses. They can t be cured with antibiotics. However, you can relieve symptoms and support your body s efforts to heal itself. No matter which symptoms you have, be sure to drink plenty of fluids (water or clear soup); stop smoking and drinking alcohol; and get plenty of rest.      Understand a fever    Take your temperature several times a day. If your fever is 100.4 F (38.0 C) for more than a day, call your doctor.    Relax, lie down. Go to bed if you want. Just get off your feet and rest. Also, drink plenty of fluids to avoid dehydration.    Take acetaminophen or a nonsteroidal anti-inflammatory agent (NSAID), such as ibuprofen.  Treat a troubled nose kindly    Breathe steam or heated humidified air to open blocked nasal passages.  a hot shower or use a vaporizer. Be careful not to get burned by the steam.    Saline nasal sprays and Mucinex help open a stuffy nose. Antihistamines can also help, but they can cause side effects such as drowsiness and drying of the eyes, nose, and mouth.  Soothe a sore throat and cough    Gargle every 2 hours with 1/4 teaspoon of salt dissolved in 1/2 cup of warm water. Suck on throat lozenges and cough drops to moisten your throat (those containing menthol work best).    Cough medicines are available but it is unclear how effective they actually are.    Try honey for cough    Take acetaminophen or an NSAID, such as ibuprofen to ease throat pain  Ease digestive problems    Put fluid back into your body. Take frequent sips of clear liquids such as water or broth. Do not drink beverages with a lot of sugar in them, such as juices and sodas. These can make diarrhea worse. Older children and adults can drink sports drinks.    As your appetite returns, you can resume your normal diet. Ask your doctor whether there are any foods you should avoid.  When to seek medical care  When you first notice symptoms, ask your health care  provider if antiviral medications are appropriate. Antibiotics should not be taken for colds or flu. Also, call your doctor if you have any of the following symptoms or if you aren t feeling better after 7 days:    Shortness of breath    Pain or pressure in the chest or abdomen    Worsening symptoms, especially after a period of improvement    Fever of 100.4 F  (38.0 C) or higher, or fever that doesn t go down with medication    Sudden dizziness or confusion    Severe or continued vomiting    Signs of dehydration, including extreme thirst, dark urine, infrequent urination, dry mouth    Spotted, red, or very sore throat      2938-4895 [employer]. [address] [place] [address]271. All rights reserved. This information is not intended as a substitute for professional medical care. Always follow your healthcare professional's instructions.

## 2021-11-05 LAB — SARS-COV-2 RNA RESP QL NAA+PROBE: NEGATIVE

## 2021-12-20 ENCOUNTER — OFFICE VISIT (OUTPATIENT)
Dept: URGENT CARE | Facility: URGENT CARE | Age: 31
End: 2021-12-20
Payer: COMMERCIAL

## 2021-12-20 VITALS
WEIGHT: 140 LBS | SYSTOLIC BLOOD PRESSURE: 132 MMHG | TEMPERATURE: 98.9 F | OXYGEN SATURATION: 99 % | HEART RATE: 77 BPM | RESPIRATION RATE: 18 BRPM | DIASTOLIC BLOOD PRESSURE: 89 MMHG | BODY MASS INDEX: 19.53 KG/M2

## 2021-12-20 DIAGNOSIS — J03.90 TONSILLITIS: ICD-10-CM

## 2021-12-20 DIAGNOSIS — Z20.822 SUSPECTED COVID-19 VIRUS INFECTION: Primary | ICD-10-CM

## 2021-12-20 DIAGNOSIS — R07.0 THROAT PAIN: ICD-10-CM

## 2021-12-20 LAB
DEPRECATED S PYO AG THROAT QL EIA: NEGATIVE
GROUP A STREP BY PCR: NOT DETECTED
SARS-COV-2 RNA RESP QL NAA+PROBE: NEGATIVE

## 2021-12-20 PROCEDURE — 99203 OFFICE O/P NEW LOW 30 MIN: CPT | Performed by: PHYSICIAN ASSISTANT

## 2021-12-20 PROCEDURE — 87651 STREP A DNA AMP PROBE: CPT | Performed by: PHYSICIAN ASSISTANT

## 2021-12-20 PROCEDURE — U0003 INFECTIOUS AGENT DETECTION BY NUCLEIC ACID (DNA OR RNA); SEVERE ACUTE RESPIRATORY SYNDROME CORONAVIRUS 2 (SARS-COV-2) (CORONAVIRUS DISEASE [COVID-19]), AMPLIFIED PROBE TECHNIQUE, MAKING USE OF HIGH THROUGHPUT TECHNOLOGIES AS DESCRIBED BY CMS-2020-01-R: HCPCS | Performed by: PHYSICIAN ASSISTANT

## 2021-12-20 PROCEDURE — U0005 INFEC AGEN DETEC AMPLI PROBE: HCPCS | Performed by: PHYSICIAN ASSISTANT

## 2021-12-20 RX ORDER — METHYLPREDNISOLONE 4 MG
TABLET, DOSE PACK ORAL
Qty: 21 TABLET | Refills: 0 | Status: SHIPPED | OUTPATIENT
Start: 2021-12-20

## 2021-12-20 NOTE — PROGRESS NOTES
Assessment & Plan     Suspected COVID-19 virus infection  covid pending  Check my chart for results  - Symptomatic; Unknown COVID-19 Virus (Coronavirus) by PCR Nose    Throat pain  Strep neg, culture pending  Magic mouthwash  Medrol for throat swelling  - Streptococcus A Rapid Screen w/Reflex to PCR - Clinic Collect  - Group A Streptococcus PCR Throat Swab  - methylPREDNISolone (MEDROL DOSEPAK) 4 MG tablet therapy pack; Follow package instructions  - magic mouthwash (ENTER INGREDIENTS IN COMMENTS) suspension; Swish and spit 5-10 mLs in mouth every 6 hours as needed 30 ml of Benadryl (12.5 mg/5 ml), 60 ml Maalox, 30 ml Viscous Lidocaine    Tonsillitis  augmentin for infection  - amoxicillin-clavulanate (AUGMENTIN) 875-125 MG tablet; Take 1 tablet by mouth 2 times daily    Review of external notes as documented elsewhere in note  30 minutes spent on the date of the encounter doing chart review, review of outside records, review of test results, interpretation of tests, patient visit and documentation        Tobacco Cessation:   reports that he has been smoking cigarettes. He has a 0.50 pack-year smoking history. He has never used smokeless tobacco.    No follow-ups on file.    Giorgio Taylor PA-C  [facility]    Luis Daniel Cheung is a 31 year old who presents for the following health issues     HPI     Throat pain  Throat swelling      Review of Systems   Constitutional, HEENT, cardiovascular, pulmonary, gi and gu systems are negative, except as otherwise noted.      Objective    /89   Pulse 77   Temp 98.9  F (37.2  C)   Resp 18   Wt 63.5 kg (140 lb)   SpO2 99%   BMI 19.53 kg/m    Body mass index is 19.53 kg/m .  Physical Exam   GENERAL: healthy, alert and no distress  EYES: Eyes grossly normal to inspection, PERRL and conjunctivae and sclerae normal  EYES: Eyes grossly normal to inspection  HENT: Positive for throat swelling, pain, erythema and edema of throat  NECK: cervical  adenopathy   RESP: lungs clear to auscultation - no rales, rhonchi or wheezes  BREAST: normal without masses, tenderness or nipple discharge and no palpable axillary masses or adenopathy  CV: regular rate and rhythm, normal S1 S2, no S3 or S4, no murmur, click or rub, no peripheral edema and peripheral pulses strong  MS: no gross musculoskeletal defects noted, no edema  SKIN: no suspicious lesions or rashes    Results for orders placed or performed in visit on 12/20/21   Streptococcus A Rapid Screen w/Reflex to PCR - Clinic Collect     Status: Normal    Specimen: Throat; Swab   Result Value Ref Range    Group A Strep antigen Negative Negative

## 2021-12-20 NOTE — LETTER
[facility] URGENT CARE [facility]  [address] [address]  [phone]      December 20, 2021    RE:  Jonatan Worley                                                                                                                                                       [address] [id number] [facility] [address]            To whom it may concern:    Jonatan Worley was seen in the urgent care today for an illness.  He will miss work tonight.        Sincerely,        Giorgio Taylor, [facility] PA-C    [facility]

## 2022-01-06 ENCOUNTER — E-VISIT (OUTPATIENT)
Dept: URGENT CARE | Facility: CLINIC | Age: 32
End: 2022-01-06

## 2022-01-06 ENCOUNTER — E-VISIT (OUTPATIENT)
Dept: URGENT CARE | Facility: CLINIC | Age: 32
End: 2022-01-06
Payer: COMMERCIAL

## 2022-01-06 DIAGNOSIS — Z20.822 CLOSE EXPOSURE TO 2019 NOVEL CORONAVIRUS: Primary | ICD-10-CM

## 2022-01-06 PROCEDURE — 99421 OL DIG E/M SVC 5-10 MIN: CPT | Performed by: PHYSICIAN ASSISTANT

## 2022-01-06 NOTE — PATIENT INSTRUCTIONS
Dear Jonatan,    Based on your exposure to COVID-19 (coronavirus), we would like to test you for this virus. I have placed an order for this test. The best time for testing is 5-7 days after the exposure.    How to schedule:  Go to your [facility] home page and scroll down to the section that says  You have an appointment that needs to be scheduled  and click the large green button that says  Schedule Now  and follow the steps to find the next available opening.     If you are unable to complete these [facility] scheduling steps, please call [phone] to schedule your testing.     Monoclonal antibody treatment after exposure:  Because you have been exposed to COVID-19, you may be able to receive a treatment with monoclonal antibodies. This treatment can lower your risk of severe illness and going to the hospital. It is given through an IV or under your skin (subcutaneous) and must be given at an infusion center.   To be eligible, you must be 12 years or older, at least 88 pounds and:    Are not fully vaccinated against COVID-19, OR    Are immunocompromised     If you would like to sign up to be considered to receive the monoclonal antibody medicine, please complete a participation form through the [facility] here:  MNRAP ([email or web address]). You may also call the [facility] COVID-19 Public Hotline at 1-759.115.8982 (open Mon-Fri: 9am-7pm and Sat: 10am-6pm).     Not all people who are eligible will receive the medicine since supply is limited. You will be contacted in the next 1 to 2 business days only if you are selected. If you do not receive a call, you have not been selected to receive the medicine. If you have any questions about this medication, please contact your primary care provider. For more information, see [email or web address]    Return to work/school/ guidance:   Please let your workplace manager and  staffing office know when your quarantine ends. We cannot give you an exact date as it depends on the information below. You can calculate this on your own or work with your manager/staffing office to calculate this.    Quarantine Guidelines:  You are considered exposed if you have been within 6 feet of an infected person(s) for 15 minutes or more over a 24-hour period. Quarantine will start after the LAST time you had contact with the infected person while they were contagious (for example, if you saw someone on Monday and Wednesday, your quarantine would start after Wednesday).     If you have NO symptoms (asymptomatic):    Stay home for 14 days (quarantine) after your LAST contact with a person who has COVID-19 (this remains the CDC recommendation for greatest protection against spread of COVID-19), OR    10-day quarantine with no test, OR    Minimum 7-day quarantine with negative RT-PCR test collected on day 5 or later    Quarantine Guideline exceptions:    People who have been fully vaccinated do not need to quarantine unless they have symptoms. You are considered fully vaccinated 2 weeks after your 2nd dose in a 2-dose series or 2 weeks after a single-dose series. This includes vaccinated health care workers.  o Fully vaccinated people should still get tested 5-7 days after exposure, even if they don t have symptoms.   Note: If you have ongoing exposure to the COVID-positive person, this quarantine period may be more than 14 days. For example, if you continue to be exposed to your child who tested positive and cannot isolate from them, then the quarantine of 7-14 days can't start until your child is no longer contagious. This is typically 10 days from onset of the child's symptoms. So, the total duration may be 17-24 days in this case.   Please contact your doctor if you have questions or call the [facility] Public Hotline: 1-431.921.2992 (Mon-Fri: 9am-7pm and Sat: 10am-6pm).     How to Quarantine:     Monitor your  symptoms until 14 days after your last exposure. If you develop signs or symptoms, isolate and get tested (even if you have been tested already).    Stay home and away from others. Don't go to school or anywhere else. Generally, quarantine means staying home from work but there are some exceptions to this. Please contact your workplace. Cover your mouth and nose with a face covering if you must be around other people.     Wash your hands and face often. Use soap and water.    What are the symptoms of COVID-19?  The most common symptoms are cough, fever and trouble breathing. Less common symptoms include headache, body aches, fatigue (feeling very tired), chills, sore throat, stuffy or runny nose, diarrhea (loose poop), loss of taste or smell, belly pain, and nausea or vomiting (feeling sick to your stomach or throwing up).  If you develop symptoms, follow these guidelines:    If you're normally healthy: Please start another eVisit.    If you have a serious health problem (like cancer, heart failure, an organ transplant or kidney disease): Call your specialty clinic. Let them know that you might have COVID-19.    Where can I get more information?    [facility] - About COVID-19: [email or web address]     CDC - What to Do If You're Sick:     www.cdc.gov/coronavirus/2019-ncov/about/steps-when-sick.html    CDC - Ending Home Isolation:  https://www.cdc.gov/coronavirus/2019-ncov/your-health/quarantine-isolation.html    CDC - Caring for Someone:  www.cdc.gov/coronavirus/2019-ncov/if-you-are-sick/care-for-someone.html    [facility] clinical trials (COVID-19 research studies): clinicalaffairs.[facility]/umn-clinical-trials    Below are the COVID-19 hotlines at the [facility] ([facility]). Interpreters are available.  o For health questions: Call 364-755-5520 or 1-915.399.1492 (7 am to 7 pm)  o For questions about schools and childcare: Call 949-409-6403 or 1-157.369.6814 (7 a.m. to 7  "p.m.)    January 6, 2022  RE:  Jonatan Worley                                                                                                                   [address] [facility] [address]      To whom it may concern:    I evaluated Jonatan Worley on January 6, 2022. Jonatan Worley should be excused from work/school.    They should let their workplace manager and staffing office know when their quarantine ends.    We can not give an exact date as it depends on the information below. They can calculate this on their own or work with their manager/staffing office to calculate this. (For example if they were exposed on 10/04, they would have to quarantine for 14 full days. That would be through 10/18. They could return on 10/19.)    Quarantine Guidelines:    Patients (\"contacts\") who have been in close prolonged contact of an infected person(s) (within six feet for at least 15 minutes within a 24 hour period) and remain asymptomatic should enter quarantine based on the following options:      14-day quarantine period (this remains the CDC recommendation for the greatest protection against spread of COVID-19) OR    Minimum 7-day quarantine with negative RT-PCR test collected on day 5 or later OR    10-day quarantine with no test   Quarantine Guideline exceptions are as follows:    People who have been fully vaccinated do not need to quarantine if the exposure was at least 2 weeks after the last vaccination. This includes vaccinated health care workers.    Not fully vaccinated and unvaccinated Individuals who work in health care, congregate care, or congregate living should be off work for 14 days from their last date of exposure. Community activities for this group can be resumed based on options above. Fully vaccinated individuals in this group do not need to quarantine from work after exposure.    Not fully vaccinated and unvaccinated people whose high-risk exposure was a household member " should always quarantine for 14 days from their last date of exposure. Fully vaccinated people in this category do not need to quarantine.    Not fully vaccinated or unvaccinated residents of congregate care and congregate living settings should always quarantine for 14 days from their last date of exposure. Fully vaccinated residents do not need to quarantine.    Note: If there is ongoing exposure to the covid positive person, this quarantine period may be longer than 14 days. (For example, if they are continually exposed to their child, who tested positive and cannot isolate from them, then the quarantine of 7-14 days can't start until their child is no longer contagious. This is typically 10 days from onset to the child's symptoms. So the total duration may be 17-24 days in this case.)    Jonatan Worley should continue symptom monitoring until day 14 post-exposure. If they develop signs or symptoms of COVID-19, they should isolate and get tested (even if they have been tested already).    Sincerely,  Giorgio Taylor PA-C

## 2022-01-06 NOTE — PATIENT INSTRUCTIONS
Dear Jonatan,    Based on your exposure to COVID-19 (coronavirus), we would like to test you for this virus. I have placed an order for this test. The best time for testing is 5-7 days after the exposure.    How to schedule:  Go to your [employer] home page and scroll down to the section that says  You have an appointment that needs to be scheduled  and click the large green button that says  Schedule Now  and follow the steps to find the next available opening.     If you are unable to complete these [employer] scheduling steps, please call [phone] to schedule your testing.     Monoclonal antibody treatment after exposure:  Because you have been exposed to COVID-19, you may be able to receive a treatment with monoclonal antibodies. This treatment can lower your risk of severe illness and going to the hospital. It is given through an IV or under your skin (subcutaneous) and must be given at an infusion center.   To be eligible, you must be 12 years or older, at least 88 pounds and:    Are not fully vaccinated against COVID-19, OR    Are immunocompromised     If you would like to sign up to be considered to receive the monoclonal antibody medicine, please complete a participation form through the [facility] here:  MNRAP ([email or web address]). You may also call the [facility] COVID-19 Public Hotline at 1-729.471.7267 (open Mon-Fri: 9am-7pm and Sat: 10am-6pm).     Not all people who are eligible will receive the medicine since supply is limited. You will be contacted in the next 1 to 2 business days only if you are selected. If you do not receive a call, you have not been selected to receive the medicine. If you have any questions about this medication, please contact your primary care provider. For more information, see [email or web address]/[email or web address]    Return to work/school/ guidance:   Please let your workplace manager and  staffing office know when your quarantine ends. We cannot give you an exact date as it depends on the information below. You can calculate this on your own or work with your manager/staffing office to calculate this.    Quarantine Guidelines:  You are considered exposed if you have been within 6 feet of an infected person(s) for 15 minutes or more over a 24-hour period. Quarantine will start after the LAST time you had contact with the infected person while they were contagious (for example, if you saw someone on Monday and Wednesday, your quarantine would start after Wednesday).     If you have NO symptoms (asymptomatic):    Stay home for 14 days (quarantine) after your LAST contact with a person who has COVID-19 (this remains the CDC recommendation for greatest protection against spread of COVID-19), OR    10-day quarantine with no test, OR    Minimum 7-day quarantine with negative RT-PCR test collected on day 5 or later    Quarantine Guideline exceptions:    People who have been fully vaccinated do not need to quarantine unless they have symptoms. You are considered fully vaccinated 2 weeks after your 2nd dose in a 2-dose series or 2 weeks after a single-dose series. This includes vaccinated health care workers.  o Fully vaccinated people should still get tested 5-7 days after exposure, even if they don t have symptoms.   Note: If you have ongoing exposure to the COVID-positive person, this quarantine period may be more than 14 days. For example, if you continue to be exposed to your child who tested positive and cannot isolate from them, then the quarantine of 7-14 days can't start until your child is no longer contagious. This is typically 10 days from onset of the child's symptoms. So, the total duration may be 17-24 days in this case.   Please contact your doctor if you have questions or call the [facility] Public Hotline: [phone] (Mon-Fri: 9am-7pm and Sat: 10am-6pm).     How to Quarantine:     Monitor your  symptoms until 14 days after your last exposure. If you develop signs or symptoms, isolate and get tested (even if you have been tested already).    Stay home and away from others. Don't go to school or anywhere else. Generally, quarantine means staying home from work but there are some exceptions to this. Please contact your workplace. Cover your mouth and nose with a face covering if you must be around other people.     Wash your hands and face often. Use soap and water.    What are the symptoms of COVID-19?  The most common symptoms are cough, fever and trouble breathing. Less common symptoms include headache, body aches, fatigue (feeling very tired), chills, sore throat, stuffy or runny nose, diarrhea (loose poop), loss of taste or smell, belly pain, and nausea or vomiting (feeling sick to your stomach or throwing up).  If you develop symptoms, follow these guidelines:    If you're normally healthy: Please start another eVisit.    If you have a serious health problem (like cancer, heart failure, an organ transplant or kidney disease): Call your specialty clinic. Let them know that you might have COVID-19.    Where can I get more information?    [facility] - About COVID-19: [email or web address]     CDC - What to Do If You're Sick:     www.cdc.gov/coronavirus/2019-ncov/about/steps-when-sick.html    CDC - Ending Home Isolation:  https://www.cdc.gov/coronavirus/2019-ncov/your-health/quarantine-isolation.html    CDC - Caring for Someone:  www.cdc.gov/coronavirus/2019-ncov/if-you-are-sick/care-for-someone.html    [facility] clinical trials (COVID-19 research studies): clinicalaffairs.[facility]/umn-clinical-trials    Below are the COVID-19 hotlines at the [facility] ([facility]). Interpreters are available.  o For health questions: Call 639-090-1261 or 1-719.637.8432 (7 am to 7 pm)  o For questions about schools and childcare: Call 247-224-2164 or 1-323.508.7974 (7 a.m. to 7  "p.m.)    January 6, 2022  RE:  Jonatan Worley                                                                                                                   [address] [facility] [address]      To whom it may concern:    I evaluated Jonatan Worley on January 6, 2022. Jonatan Worley should be excused from work/school.    They should let their workplace manager and staffing office know when their quarantine ends.    We can not give an exact date as it depends on the information below. They can calculate this on their own or work with their manager/staffing office to calculate this. (For example if they were exposed on 10/04, they would have to quarantine for 14 full days. That would be through 10/18. They could return on 10/19.)    Quarantine Guidelines:    Patients (\"contacts\") who have been in close prolonged contact of an infected person(s) (within six feet for at least 15 minutes within a 24 hour period) and remain asymptomatic should enter quarantine based on the following options:      14-day quarantine period (this remains the CDC recommendation for the greatest protection against spread of COVID-19) OR    Minimum 7-day quarantine with negative RT-PCR test collected on day 5 or later OR    10-day quarantine with no test   Quarantine Guideline exceptions are as follows:    People who have been fully vaccinated do not need to quarantine if the exposure was at least 2 weeks after the last vaccination. This includes vaccinated health care workers.    Not fully vaccinated and unvaccinated Individuals who work in health care, congregate care, or congregate living should be off work for 14 days from their last date of exposure. Community activities for this group can be resumed based on options above. Fully vaccinated individuals in this group do not need to quarantine from work after exposure.    Not fully vaccinated and unvaccinated people whose high-risk exposure was a household member " should always quarantine for 14 days from their last date of exposure. Fully vaccinated people in this category do not need to quarantine.    Not fully vaccinated or unvaccinated residents of congregate care and congregate living settings should always quarantine for 14 days from their last date of exposure. Fully vaccinated residents do not need to quarantine.    Note: If there is ongoing exposure to the covid positive person, this quarantine period may be longer than 14 days. (For example, if they are continually exposed to their child, who tested positive and cannot isolate from them, then the quarantine of 7-14 days can't start until their child is no longer contagious. This is typically 10 days from onset to the child's symptoms. So the total duration may be 17-24 days in this case.)    Jonatan Worley should continue symptom monitoring until day 14 post-exposure. If they develop signs or symptoms of COVID-19, they should isolate and get tested (even if they have been tested already).    Sincerely,  Giorgio Taylor PA-C

## 2022-01-07 ENCOUNTER — LAB (OUTPATIENT)
Dept: LAB | Facility: CLINIC | Age: 32
End: 2022-01-07
Attending: PHYSICIAN ASSISTANT
Payer: COMMERCIAL

## 2022-01-07 DIAGNOSIS — Z20.822 CLOSE EXPOSURE TO 2019 NOVEL CORONAVIRUS: ICD-10-CM

## 2022-01-07 PROCEDURE — U0003 INFECTIOUS AGENT DETECTION BY NUCLEIC ACID (DNA OR RNA); SEVERE ACUTE RESPIRATORY SYNDROME CORONAVIRUS 2 (SARS-COV-2) (CORONAVIRUS DISEASE [COVID-19]), AMPLIFIED PROBE TECHNIQUE, MAKING USE OF HIGH THROUGHPUT TECHNOLOGIES AS DESCRIBED BY CMS-2020-01-R: HCPCS

## 2022-01-08 LAB — SARS-COV-2 RNA RESP QL NAA+PROBE: NEGATIVE

## 2022-02-13 ENCOUNTER — HEALTH MAINTENANCE LETTER (OUTPATIENT)
Age: 32
End: 2022-02-13

## 2022-10-15 ENCOUNTER — HEALTH MAINTENANCE LETTER (OUTPATIENT)
Age: 32
End: 2022-10-15

## 2023-03-26 ENCOUNTER — HEALTH MAINTENANCE LETTER (OUTPATIENT)
Age: 33
End: 2023-03-26

## 2023-06-08 ENCOUNTER — HOSPITAL ENCOUNTER (EMERGENCY)
Facility: CLINIC | Age: 33
Discharge: HOME | End: 2023-06-08
Attending: EMERGENCY MEDICINE | Admitting: EMERGENCY MEDICINE
Payer: COMMERCIAL

## 2023-06-08 VITALS
SYSTOLIC BLOOD PRESSURE: 126 MMHG | TEMPERATURE: 98.3 F | HEART RATE: 74 BPM | RESPIRATION RATE: 18 BRPM | OXYGEN SATURATION: 99 % | DIASTOLIC BLOOD PRESSURE: 76 MMHG | BODY MASS INDEX: 21 KG/M2 | WEIGHT: 150 LBS | HEIGHT: 71 IN

## 2023-06-08 DIAGNOSIS — N30.00 ACUTE CYSTITIS WITHOUT HEMATURIA: ICD-10-CM

## 2023-06-08 LAB
ALBUMIN UR-MCNC: 30 MG/DL
ANION GAP SERPL CALCULATED.3IONS-SCNC: 7 MMOL/L (ref 5–18)
APPEARANCE UR: CLEAR
BACTERIA #/AREA URNS HPF: ABNORMAL /HPF
BILIRUB UR QL STRIP: NEGATIVE
BUN SERPL-MCNC: 9 MG/DL (ref 8–22)
CALCIUM SERPL-MCNC: 9 MG/DL (ref 8.5–10.5)
CHLORIDE BLD-SCNC: 104 MMOL/L (ref 98–107)
CO2 SERPL-SCNC: 27 MMOL/L (ref 22–31)
COLOR UR AUTO: COLORLESS
CREAT SERPL-MCNC: 0.76 MG/DL (ref 0.7–1.3)
ERYTHROCYTE [DISTWIDTH] IN BLOOD BY AUTOMATED COUNT: 13.2 % (ref 10–15)
GFR SERPL CREATININE-BSD FRML MDRD: >90 ML/MIN/1.73M2
GLUCOSE BLD-MCNC: 93 MG/DL (ref 70–125)
GLUCOSE UR STRIP-MCNC: NEGATIVE MG/DL
HCT VFR BLD AUTO: 45.3 % (ref 40–53)
HGB BLD-MCNC: 15.5 G/DL (ref 13.3–17.7)
HGB UR QL STRIP: ABNORMAL
KETONES UR STRIP-MCNC: NEGATIVE MG/DL
LEUKOCYTE ESTERASE UR QL STRIP: ABNORMAL
MCH RBC QN AUTO: 30.1 PG (ref 26.5–33)
MCHC RBC AUTO-ENTMCNC: 34.2 G/DL (ref 31.5–36.5)
MCV RBC AUTO: 88 FL (ref 78–100)
MUCOUS THREADS #/AREA URNS LPF: PRESENT /LPF
NITRATE UR QL: NEGATIVE
PH UR STRIP: 8 [PH] (ref 5–7)
PLATELET # BLD AUTO: 380 10E3/UL (ref 150–450)
POTASSIUM BLD-SCNC: 3.9 MMOL/L (ref 3.5–5)
RBC # BLD AUTO: 5.15 10E6/UL (ref 4.4–5.9)
RBC URINE: <1 /HPF
SODIUM SERPL-SCNC: 138 MMOL/L (ref 136–145)
SP GR UR STRIP: 1 (ref 1–1.03)
UROBILINOGEN UR STRIP-MCNC: <2 MG/DL
WBC # BLD AUTO: 14.9 10E3/UL (ref 4–11)
WBC URINE: 13 /HPF

## 2023-06-08 PROCEDURE — 36415 COLL VENOUS BLD VENIPUNCTURE: CPT | Performed by: EMERGENCY MEDICINE

## 2023-06-08 PROCEDURE — 87086 URINE CULTURE/COLONY COUNT: CPT | Performed by: EMERGENCY MEDICINE

## 2023-06-08 PROCEDURE — 250N000013 HC RX MED GY IP 250 OP 250 PS 637: Performed by: EMERGENCY MEDICINE

## 2023-06-08 PROCEDURE — 82310 ASSAY OF CALCIUM: CPT | Performed by: EMERGENCY MEDICINE

## 2023-06-08 PROCEDURE — 85027 COMPLETE CBC AUTOMATED: CPT | Performed by: EMERGENCY MEDICINE

## 2023-06-08 PROCEDURE — 81003 URINALYSIS AUTO W/O SCOPE: CPT | Performed by: EMERGENCY MEDICINE

## 2023-06-08 PROCEDURE — 99283 EMERGENCY DEPT VISIT LOW MDM: CPT

## 2023-06-08 RX ORDER — CEPHALEXIN 500 MG/1
500 CAPSULE ORAL 2 TIMES DAILY
Qty: 14 CAPSULE | Refills: 0 | Status: SHIPPED | OUTPATIENT
Start: 2023-06-08 | End: 2023-06-15

## 2023-06-08 RX ORDER — CEPHALEXIN 500 MG/1
500 CAPSULE ORAL ONCE
Status: COMPLETED | OUTPATIENT
Start: 2023-06-08 | End: 2023-06-08

## 2023-06-08 RX ADMIN — CEPHALEXIN 500 MG: 500 CAPSULE ORAL at 22:09

## 2023-06-09 NOTE — ED TRIAGE NOTES
Pt was seen in urgent care for UTI symptoms Sunday. Has been on antibiotics since Sunday, but symptoms are still present. Pain with urination and flank pain.      Triage Assessment     Row Name 06/08/23 2053       Triage Assessment (Adult)    Airway WDL WDL       Respiratory WDL    Respiratory WDL WDL       Skin Circulation/Temperature WDL    Skin Circulation/Temperature WDL WDL       Cardiac WDL    Cardiac WDL WDL       Peripheral/Neurovascular WDL    Peripheral Neurovascular WDL WDL       Cognitive/Neuro/Behavioral WDL    Cognitive/Neuro/Behavioral WDL WDL

## 2023-06-09 NOTE — ED PROVIDER NOTES
EMERGENCY DEPARTMENT ENCOUnter      NAME: Jonatan Worley  AGE: 32 year old male  YOB: 1990  MRN: [id number]  EVALUATION DATE & TIME: No admission date for patient encounter.    PCP: No Ref-Primary, Physician    ED PROVIDER: Bill Ramsay DO      Chief Complaint   Patient presents with     Dysuria     Flank Pain         FINAL IMPRESSION:  1. Acute cystitis without hematuria          ED COURSE & MEDICAL DECISION MAKIN:55 PM I introduced myself to the patient, obtained patient history, performed a physical exam, and discussed plan for ED workup including potential diagnostic laboratory/imaging studies and interventions.  9:53 PM I rechecked the patient and updated them on laboratory and imaging results.        The patient presented to the emergency department today with lower abdominal and flank pain.  He was recently seen for similar symptoms and was started on antibiotics for urinary tract infection.  His symptoms initially improved but then worsened.  He then developed of the flank pain.  He denies any other symptoms at this time.  Urinalysis today again shows signs of urinary tract infection.  His other lab tests are unremarkable.  He was recently started on Macrobid.  I feel it would be reasonable to switch her to Keflex as this will provide better coverage for pyelonephritis.  Given his overall well appearance I feel he can be safely discharged home.  He is comfortable with this plan.      Medical Decision Making    History:    Supplemental history from: Documented in chart, if applicable    External Record(s) reviewed: Documented in chart, if applicable.    Work Up:    Chart documentation includes differential considered and any EKGs or imaging independently interpreted by provider, where specified.    In additional to work up documented, I considered the following work up: Documented in chart, if applicable.    External consultation:    Discussion of management with another provider:  Documented in chart, if applicable    Complicating factors:    Care impacted by chronic illness: N/A    Care affected by social determinants of health: N/A    Disposition considerations: Discharge. I prescribed additional prescription strength medication(s) as charted. N/A.        At the conclusion of the encounter I discussed the results of all of the tests and the disposition. The questions were answered. The patient or family acknowledged understanding and was agreeable with the care plan.         MEDICATIONS GIVEN IN THE EMERGENCY:  Medications   cephALEXin (KEFLEX) capsule 500 mg (500 mg Oral $Given 6/8/23 2209)       NEW PRESCRIPTIONS STARTED AT TODAY'S ER VISIT  Discharge Medication List as of 6/8/2023 10:11 PM      START taking these medications    Details   cephALEXin (KEFLEX) 500 MG capsule Take 1 capsule (500 mg) by mouth 2 times daily for 7 days, Disp-14 capsule, R-0, E-Prescribe                =================================================================    HPI        Jonatan Worley is a 32 year old male with a pertinent history of urinary incontinence and generalized abdominal pain who presents to this ED via walk in for evaluation of dysuria.     Per chart review, the patient was seen in [facility] on 6/4/2023 presenting with hematuria and urinary urgency/frequency. Urinalysis showed signs of infection. Patient was prescribed Macrobid (100 MG) with plans for outpatient follow up with primary care provider in 1 week.     Patient was seen at urgent care for UTI symptoms last Sunday (6/4/2023) and was started on antibiotics. He presents to the ED again after experiencing urinary urgency/frequency and bilateral flank pain. The flank pain is worse on the left side. Also reports cloudy and dark urine. Denies any fever.     REVIEW OF SYSTEMS     Constitutional:  Denies fever or chills  HENT:  Denies sore throat   Respiratory:  Denies cough or shortness of breath   Cardiovascular:  Denies  "chest pain or palpitations  GI:  Denies abdominal pain, nausea, or vomiting  : Positive for urinary frequency and cloudy/dark urine.   Musculoskeletal:  Positive for flank pain.   Skin:  Denies rash   Neurologic:  Denies headache, focal weakness or sensory changes    All other systems reviewed and are negative      PAST MEDICAL HISTORY:  No past medical history on file.    PAST SURGICAL HISTORY:  No past surgical history on file.        CURRENT MEDICATIONS:    cephALEXin (KEFLEX) 500 MG capsule  amoxicillin-clavulanate (AUGMENTIN) 875-125 MG tablet  fluticasone (FLONASE) 50 MCG/ACT nasal spray  gentamicin (GARAMYCIN) 0.3 % ophthalmic solution  magic mouthwash (ENTER INGREDIENTS IN COMMENTS) suspension  methylPREDNISolone (MEDROL DOSEPAK) 4 MG tablet therapy pack  NO ACTIVE MEDICATIONS        ALLERGIES:  Allergies   Allergen Reactions     No Known Allergies        FAMILY HISTORY:  No family history on file.    SOCIAL HISTORY:   Social History     Socioeconomic History     Marital status: Single   Tobacco Use     Smoking status: Every Day     Packs/day: 0.50     Years: 1.00     Pack years: 0.50     Types: Cigarettes     Smokeless tobacco: Never     Tobacco comments:     mom smokes too   Substance and Sexual Activity     Alcohol use: No     Drug use: No     Sexual activity: Not Currently     Comment: stas 3/31/09       VITALS:  Patient Vitals for the past 24 hrs:   BP Temp Temp src Pulse Resp SpO2 Height Weight   06/08/23 2200 126/76 -- -- 74 -- 99 % -- --   06/08/23 2145 127/80 -- -- 77 -- 98 % -- --   06/08/23 2144 129/83 -- -- 70 -- 100 % -- --   06/08/23 2055 -- -- -- -- -- -- 1.803 m (5' 11\") 68 kg (150 lb)   06/08/23 2052 (!) 171/95 98.3  F (36.8  C) Oral 85 18 98 % -- --       PHYSICAL EXAM    Constitutional:  Well developed, Well nourished,  HENT:  Normocephalic, Atraumatic, Oropharynx moist, Nose normal.   Eyes:  EOMI, Conjunctiva normal, No discharge.   Respiratory:  Normal breath sounds, No respiratory " distress, No wheezing, No chest tenderness.   Cardiovascular:  Normal heart rate, Normal rhythm, No murmurs  GI:  Soft, No tenderness, No guarding, Minimal CVA tenderness bilaterally.   Musculoskeletal:  No tenderness to palpation or major deformities noted.   Extremities: No lower extremity edema.  Neurologic:  Alert & oriented x 3, No focal deficits noted.   Psychiatric:  Affect normal, Judgment normal, Mood normal.        LAB:  All pertinent labs reviewed and interpreted.  Results for orders placed or performed during the hospital encounter of 06/08/23   UA with Microscopic reflex to Culture    Specimen: Urine, Clean Catch   Result Value Ref Range    Color Urine Colorless Colorless, Straw, Light Yellow, Yellow    Appearance Urine Clear Clear    Glucose Urine Negative Negative mg/dL    Bilirubin Urine Negative Negative    Ketones Urine Negative Negative mg/dL    Specific Gravity Urine 1.002 1.001 - 1.030    Blood Urine >1.0 mg/dL (A) Negative    pH Urine 8.0 (H) 5.0 - 7.0    Protein Albumin Urine 30 (A) Negative mg/dL    Urobilinogen Urine <2.0 <2.0 mg/dL    Nitrite Urine Negative Negative    Leukocyte Esterase Urine 500 Ken/uL (A) Negative    Bacteria Urine Few (A) None Seen /HPF    Mucus Urine Present (A) None Seen /LPF    RBC Urine <1 <=2 /HPF    WBC Urine 13 (H) <=5 /HPF   CBC with platelets   Result Value Ref Range    WBC Count 14.9 (H) 4.0 - 11.0 10e3/uL    RBC Count 5.15 4.40 - 5.90 10e6/uL    Hemoglobin 15.5 13.3 - 17.7 g/dL    Hematocrit 45.3 40.0 - 53.0 %    MCV 88 78 - 100 fL    MCH 30.1 26.5 - 33.0 pg    MCHC 34.2 31.5 - 36.5 g/dL    RDW 13.2 10.0 - 15.0 %    Platelet Count 380 150 - 450 10e3/uL   Basic metabolic panel   Result Value Ref Range    Sodium 138 136 - 145 mmol/L    Potassium 3.9 3.5 - 5.0 mmol/L    Chloride 104 98 - 107 mmol/L    Carbon Dioxide (CO2) 27 22 - 31 mmol/L    Anion Gap 7 5 - 18 mmol/L    Urea Nitrogen 9 8 - 22 mg/dL    Creatinine 0.76 0.70 - 1.30 mg/dL    Calcium 9.0 8.5 - 10.5  mg/dL    Glucose 93 70 - 125 mg/dL    GFR Estimate >90 >60 mL/min/1.73m2           I, Morris De León, am serving as a scribe to document services personally performed by Dr. Ramsay based on my observation and the provider's statements to me. I, Bill Ramsay, DO attest that Morris De León is acting in a scribe capacity, has observed my performance of the services and has documented them in accordance with my direction.    Bill Rasmay DO  Emergency Medicine  [facility] EMERGENCY ROOM  [address] [facility] [id number] [phone]  Dept: [phone]     Bill Ramsay MD  06/09/23 6168

## 2023-06-09 NOTE — DISCHARGE INSTRUCTIONS
You were found to have signs of a bladder infection today.  It is possible that this has started to involve your kidneys.  Take the prescribed antibiotic as directed and follow-up closely as an outpatient.  Return to the ER for any worsening symptoms or other concerns.

## 2023-06-10 LAB — BACTERIA UR CULT: ABNORMAL

## 2023-07-10 ENCOUNTER — HOSPITAL ENCOUNTER (EMERGENCY)
Facility: CLINIC | Age: 33
Discharge: HOME | End: 2023-07-10
Attending: EMERGENCY MEDICINE | Admitting: EMERGENCY MEDICINE
Payer: COMMERCIAL

## 2023-07-10 ENCOUNTER — APPOINTMENT (OUTPATIENT)
Dept: CT IMAGING | Facility: CLINIC | Age: 33
End: 2023-07-10
Attending: EMERGENCY MEDICINE
Payer: COMMERCIAL

## 2023-07-10 VITALS
DIASTOLIC BLOOD PRESSURE: 79 MMHG | WEIGHT: 155 LBS | SYSTOLIC BLOOD PRESSURE: 137 MMHG | HEART RATE: 61 BPM | BODY MASS INDEX: 21.62 KG/M2 | TEMPERATURE: 97.6 F | RESPIRATION RATE: 16 BRPM | OXYGEN SATURATION: 97 %

## 2023-07-10 DIAGNOSIS — N10 PYELONEPHRITIS, ACUTE: ICD-10-CM

## 2023-07-10 LAB
ALBUMIN UR-MCNC: 70 MG/DL
ANION GAP SERPL CALCULATED.3IONS-SCNC: 8 MMOL/L (ref 5–18)
APPEARANCE UR: ABNORMAL
BACTERIA #/AREA URNS HPF: ABNORMAL /HPF
BILIRUB UR QL STRIP: NEGATIVE
BUN SERPL-MCNC: 8 MG/DL (ref 8–22)
C REACTIVE PROTEIN LHE: 2.2 MG/DL (ref 0–?)
C TRACH DNA SPEC QL PROBE+SIG AMP: NEGATIVE
CALCIUM SERPL-MCNC: 8.9 MG/DL (ref 8.5–10.5)
CHLORIDE BLD-SCNC: 106 MMOL/L (ref 98–107)
CO2 SERPL-SCNC: 26 MMOL/L (ref 22–31)
COLOR UR AUTO: YELLOW
CREAT SERPL-MCNC: 0.76 MG/DL (ref 0.7–1.3)
ERYTHROCYTE [DISTWIDTH] IN BLOOD BY AUTOMATED COUNT: 13 % (ref 10–15)
GFR SERPL CREATININE-BSD FRML MDRD: >90 ML/MIN/1.73M2
GLUCOSE BLD-MCNC: 93 MG/DL (ref 70–125)
GLUCOSE UR STRIP-MCNC: NEGATIVE MG/DL
HCT VFR BLD AUTO: 43.1 % (ref 40–53)
HGB BLD-MCNC: 15.1 G/DL (ref 13.3–17.7)
HGB UR QL STRIP: ABNORMAL
KETONES UR STRIP-MCNC: NEGATIVE MG/DL
LEUKOCYTE ESTERASE UR QL STRIP: ABNORMAL
MCH RBC QN AUTO: 30.6 PG (ref 26.5–33)
MCHC RBC AUTO-ENTMCNC: 35 G/DL (ref 31.5–36.5)
MCV RBC AUTO: 87 FL (ref 78–100)
MUCOUS THREADS #/AREA URNS LPF: PRESENT /LPF
N GONORRHOEA DNA SPEC QL NAA+PROBE: NEGATIVE
NITRATE UR QL: POSITIVE
PH UR STRIP: 6.5 [PH] (ref 5–7)
PLATELET # BLD AUTO: 348 10E3/UL (ref 150–450)
POTASSIUM BLD-SCNC: 4.1 MMOL/L (ref 3.5–5)
RBC # BLD AUTO: 4.93 10E6/UL (ref 4.4–5.9)
RBC URINE: 58 /HPF
SODIUM SERPL-SCNC: 140 MMOL/L (ref 136–145)
SP GR UR STRIP: 1.01 (ref 1–1.03)
UROBILINOGEN UR STRIP-MCNC: <2 MG/DL
WBC # BLD AUTO: 11.6 10E3/UL (ref 4–11)
WBC CLUMPS #/AREA URNS HPF: PRESENT /HPF
WBC URINE: >182 /HPF

## 2023-07-10 PROCEDURE — 250N000011 HC RX IP 250 OP 636: Mod: JZ | Performed by: EMERGENCY MEDICINE

## 2023-07-10 PROCEDURE — 87591 N.GONORRHOEAE DNA AMP PROB: CPT | Performed by: EMERGENCY MEDICINE

## 2023-07-10 PROCEDURE — 99285 EMERGENCY DEPT VISIT HI MDM: CPT | Mod: 25

## 2023-07-10 PROCEDURE — 85027 COMPLETE CBC AUTOMATED: CPT | Performed by: EMERGENCY MEDICINE

## 2023-07-10 PROCEDURE — 36415 COLL VENOUS BLD VENIPUNCTURE: CPT | Performed by: EMERGENCY MEDICINE

## 2023-07-10 PROCEDURE — 87086 URINE CULTURE/COLONY COUNT: CPT | Performed by: EMERGENCY MEDICINE

## 2023-07-10 PROCEDURE — 96374 THER/PROPH/DIAG INJ IV PUSH: CPT | Mod: 59

## 2023-07-10 PROCEDURE — 80048 BASIC METABOLIC PNL TOTAL CA: CPT | Performed by: EMERGENCY MEDICINE

## 2023-07-10 PROCEDURE — 96375 TX/PRO/DX INJ NEW DRUG ADDON: CPT

## 2023-07-10 PROCEDURE — 74177 CT ABD & PELVIS W/CONTRAST: CPT

## 2023-07-10 PROCEDURE — 86140 C-REACTIVE PROTEIN: CPT | Performed by: EMERGENCY MEDICINE

## 2023-07-10 PROCEDURE — 81003 URINALYSIS AUTO W/O SCOPE: CPT | Performed by: EMERGENCY MEDICINE

## 2023-07-10 RX ORDER — CIPROFLOXACIN 500 MG/1
500 TABLET, FILM COATED ORAL 2 TIMES DAILY
Qty: 14 TABLET | Refills: 0 | Status: SHIPPED | OUTPATIENT
Start: 2023-07-10

## 2023-07-10 RX ORDER — CEFTRIAXONE 1 G/1
1 INJECTION, POWDER, FOR SOLUTION INTRAMUSCULAR; INTRAVENOUS ONCE
Status: COMPLETED | OUTPATIENT
Start: 2023-07-10 | End: 2023-07-10

## 2023-07-10 RX ORDER — IOPAMIDOL 755 MG/ML
100 INJECTION, SOLUTION INTRAVASCULAR ONCE
Status: COMPLETED | OUTPATIENT
Start: 2023-07-10 | End: 2023-07-10

## 2023-07-10 RX ORDER — KETOROLAC TROMETHAMINE 15 MG/ML
15 INJECTION, SOLUTION INTRAMUSCULAR; INTRAVENOUS ONCE
Status: COMPLETED | OUTPATIENT
Start: 2023-07-10 | End: 2023-07-10

## 2023-07-10 RX ADMIN — IOPAMIDOL 75 ML: 755 INJECTION, SOLUTION INTRAVENOUS at 09:33

## 2023-07-10 RX ADMIN — CEFTRIAXONE 1 G: 1 INJECTION, POWDER, FOR SOLUTION INTRAMUSCULAR; INTRAVENOUS at 09:12

## 2023-07-10 RX ADMIN — KETOROLAC TROMETHAMINE 15 MG: 15 INJECTION, SOLUTION INTRAMUSCULAR; INTRAVENOUS at 09:04

## 2023-07-10 ASSESSMENT — ENCOUNTER SYMPTOMS
DIFFICULTY URINATING: 1
ABDOMINAL PAIN: 1
FLANK PAIN: 1
DYSURIA: 1

## 2023-07-10 ASSESSMENT — ACTIVITIES OF DAILY LIVING (ADL): ADLS_ACUITY_SCORE: 33

## 2023-07-10 NOTE — ED TRIAGE NOTES
Pt presents to the ED with c/o right sided abdominal pain that radiates up the right side, and down to the right side of groin. Pt reports that the pain started yesterday. Is worse when moving around. Pt endorses foul smelling urine. No hx of abdominal surgeries. Pt reports being seen and treated for UTI last month, thinks that it hasn't cleared up.      Triage Assessment       Row Name 07/10/23 0812       Triage Assessment (Adult)    Airway WDL WDL       Respiratory WDL    Respiratory WDL WDL       Skin Circulation/Temperature WDL    Skin Circulation/Temperature WDL WDL       Cardiac WDL    Cardiac WDL WDL       Peripheral/Neurovascular WDL    Peripheral Neurovascular WDL WDL       Cognitive/Neuro/Behavioral WDL    Cognitive/Neuro/Behavioral WDL WDL

## 2023-07-10 NOTE — ED PROVIDER NOTES
EMERGENCY DEPARTMENT ENCOUNTER      NAME: Jonatan Worley  AGE: 32 year old male  YOB: 1990  MRN: [id number]  EVALUATION DATE & TIME: 7/10/2023  8:18 AM    PCP: No Ref-Primary, Physician    ED PROVIDER: Erendira Oh DO      Chief Complaint   Patient presents with     Abdominal Pain     Groin Pain         FINAL IMPRESSION:  1. Pyelonephritis, acute          ED COURSE & MEDICAL DECISION MAKING:    Pertinent Labs & Imaging studies reviewed. (See chart for details)  8:34 AM I met the patient and performed my initial interview and exam.  10:17 AM I rechecked the patient and updated them on laboratory and imaging results. We discussed the plan for discharge and the patient is agreeable. Reviewed supportive cares, symptomatic treatment, outpatient follow up, and reasons to return to the Emergency Department. Patient to be discharged by ED RN.     32 year old male presents to the Emergency Department for evaluation of right flank and groin pain as well as dysuria.  Patient diagnosed with UTI last month.  Originally seen at urgency room.  Presented here and was started on cephalexin.  Completed this course of antibiotics.  Urine culture was pansensitive E. coli.  Felt like symptoms improved but again got worse a week ago.  Some burning with urination.  Now pain to his flank and right groin that radiates into his testicles.  No fevers.  Is nontoxic-appearing.  He denies any abnormal penile discharge, no acute concerns for gonorrhea and chlamydia but admits he could not be 100% sure.  Urine again looks infected.  Plan for labs and imaging to evaluate for ureteral stone, nephric abscess among others.  We will collect gonorrhea and chlamydia today but unlikely to be the cause.  He was given ceftriaxone and Toradol in the ED. consistent with infection, nitrate positive.  Labs are unremarkable.  CT imaging shows mucosal enhancement of the right ureter and cystitis, however no other acute findings.  Again patient  is nontoxic-appearing.  He is given a dose of ceftriaxone in the ED and will discharge with ciprofloxacin.  Given the recurrence of this infection and otherwise healthy young male, will refer to urology.  I placed a referral to primary care as well.  Discussed symptomatic care and return precautions.    At the conclusion of the encounter I discussed the results of all of the tests and the disposition. The questions were answered. The patient or family acknowledged understanding and was agreeable with the care plan.     Medical Decision Making    History:    Supplemental history from: Documented in chart, if applicable    External Record(s) reviewed: Documented in chart, if applicable. and Other: ED visit on 6/8/2023     Work Up:    Chart documentation includes differential considered and any EKGs or imaging independently interpreted by provider, where specified.    In additional to work up documented, I considered the following work up: Documented in chart, if applicable.    External consultation:    Discussion of management with another provider: Documented in chart, if applicable    Complicating factors:    Care impacted by chronic illness: N/A    Care affected by social determinants of health: N/A    Disposition considerations: Discharge. I prescribed additional prescription strength medication(s) as charted. See documentation for any additional details.        MEDICATIONS GIVEN IN THE EMERGENCY:  Medications   ketorolac (TORADOL) injection 15 mg (15 mg Intravenous $Given 7/10/23 0904)   cefTRIAXone (ROCEPHIN) 1 g vial to attach to  mL bag for ADULTS or NS 50 mL bag for PEDS (0 g Intravenous Stopped 7/10/23 0927)   0.9% sodium chloride BOLUS (1,000 mLs Intravenous Not Given 7/10/23 0907)   iopamidol (ISOVUE-370) solution 100 mL (75 mLs Intravenous $Given 7/10/23 0933)       NEW PRESCRIPTIONS STARTED AT TODAY'S ER VISIT  New Prescriptions    No medications on file           =================================================================    HPI    Patient information was obtained from: Patient     Use of : N/A         Jonatan Worley is a 32 year old male with a pertinent history of generalized abdominal pain, urinary incontinence who presents to this ED via walk in for evaluation of abdominal pain and groin pain.     Per chart review, the patient was seen in [facility] Emergency Department on 6/8/2023 presenting with dysuria and flank pain. Prior to ED visit patient was at the [facility] on 6/4/2023 and was prescribed Macrobid after urinalysis showed signs of infection. While in the ED, urinalysis again showed signs of infection. Other lab tests were unremarkable. Patient was switched to Keflex from Macrobid and discharged with plan for outpatient follow up with his primary care provider.     Patient reports dysuria, urinary retention, urine odor, right lower quadrant abdominal pain and right sided flank pain that began a week ago. States that he was seen in the ED last month for similar symptoms and felt better after taking antibiotics until now. The right lower quadrant abdominal pain radiates to his right groin and he endorses right testicular pain with palpation. He's been taking ibuprofen for his pain. Denies any discharge. Patient is unsure about any STD concerns. Last drink of fluids was around 7:30 AM this morning.     REVIEW OF SYSTEMS   Review of Systems   Gastrointestinal: Positive for abdominal pain (Right).   Genitourinary: Positive for difficulty urinating, dysuria, flank pain (Right) and testicular pain (Right ). Negative for penile discharge.        Positive for urine odor           PAST MEDICAL HISTORY:  No past medical history on file.    PAST SURGICAL HISTORY:  No past surgical history on file.        CURRENT MEDICATIONS:    amoxicillin-clavulanate (AUGMENTIN) 875-125 MG tablet  fluticasone (FLONASE) 50 MCG/ACT nasal spray  gentamicin  (GARAMYCIN) 0.3 % ophthalmic solution  magic mouthwash (ENTER INGREDIENTS IN COMMENTS) suspension  methylPREDNISolone (MEDROL DOSEPAK) 4 MG tablet therapy pack  NO ACTIVE MEDICATIONS         ALLERGIES:  Allergies   Allergen Reactions     No Known Allergies        FAMILY HISTORY:  No family history on file.    SOCIAL HISTORY:   Social History     Socioeconomic History     Marital status: Single   Tobacco Use     Smoking status: Every Day     Packs/day: 0.50     Years: 1.00     Pack years: 0.50     Types: Cigarettes     Smokeless tobacco: Never     Tobacco comments:     mom smokes too   Substance and Sexual Activity     Alcohol use: No     Drug use: No     Sexual activity: Not Currently     Comment: stas 3/31/09       VITALS:  /79   Pulse 61   Temp 97.6  F (36.4  C) (Temporal)   Resp 18   Wt 70.3 kg (155 lb)   SpO2 97%   BMI 21.62 kg/m      PHYSICAL EXAM    Physical Exam  Constitutional:       General: He is not in acute distress.  HENT:      Head: Normocephalic and atraumatic.      Mouth/Throat:      Pharynx: Oropharynx is clear.   Eyes:      Pupils: Pupils are equal, round, and reactive to light.   Cardiovascular:      Rate and Rhythm: Normal rate and regular rhythm.      Pulses: Normal pulses.      Heart sounds: Normal heart sounds.   Pulmonary:      Effort: Pulmonary effort is normal.      Breath sounds: Normal breath sounds.   Abdominal:      General: Abdomen is flat. Bowel sounds are normal.      Palpations: Abdomen is soft.      Tenderness: There is abdominal tenderness in the right lower quadrant. There is right CVA tenderness.   Musculoskeletal:         General: Normal range of motion.   Skin:     General: Skin is warm and dry.      Capillary Refill: Capillary refill takes less than 2 seconds.   Neurological:      General: No focal deficit present.      Mental Status: He is alert and oriented to person, place, and time.         LAB:  All pertinent labs reviewed and interpreted.  Labs Ordered and  Resulted from Time of ED Arrival to Time of ED Departure   ROUTINE UA WITH MICROSCOPIC REFLEX TO CULTURE - Abnormal       Result Value    Color Urine Yellow      Appearance Urine Cloudy (*)     Glucose Urine Negative      Bilirubin Urine Negative      Ketones Urine Negative      Specific Gravity Urine 1.014      Blood Urine 1.0 mg/dL (*)     pH Urine 6.5      Protein Albumin Urine 70 (*)     Urobilinogen Urine <2.0      Nitrite Urine Positive (*)     Leukocyte Esterase Urine 500 Ken/uL (*)     Bacteria Urine Few (*)     WBC Clumps Urine Present (*)     Mucus Urine Present (*)     RBC Urine 58 (*)     WBC Urine >182 (*)    CBC WITH PLATELETS - Abnormal    WBC Count 11.6 (*)     RBC Count 4.93      Hemoglobin 15.1      Hematocrit 43.1      MCV 87      MCH 30.6      MCHC 35.0      RDW 13.0      Platelet Count 348     CRP INFLAMMATION - Abnormal    CRP 2.2 (*)    BASIC METABOLIC PANEL - Normal    Sodium 140      Potassium 4.1      Chloride 106      Carbon Dioxide (CO2) 26      Anion Gap 8      Urea Nitrogen 8      Creatinine 0.76      Calcium 8.9      Glucose 93      GFR Estimate >90     CHLAMYDIA TRACHOMATIS/NEISSERIA GONORRHOEAE BY PCR   URINE CULTURE       RADIOLOGY:  Reviewed all pertinent imaging. Please see official radiology report.  CT Abdomen Pelvis w Contrast   Final Result   IMPRESSION:    1.  Mucosal enhancement of the right ureter suggests an inflammatory or infectious process. Wall thickening of the urinary bladder indicates cystitis.            I, Morris De León, am serving as a scribe to document services personally performed by Dr. Erendira Oh based on my observation and the provider's statements to me. I, Erendira Oh, DO attest that Morris De León is acting in a scribe capacity, has observed my performance of the services and has documented them in accordance with my direction.    Erendira Oh, DO  Emergency Medicine  [facility]  EMERGENCY ROOM  [address] [place] [id number]  [phone]  Dept: [phone]     Althea, Erendira KRISHNAMURTHY DO  07/10/23 1203

## 2023-07-10 NOTE — DISCHARGE INSTRUCTIONS
Your urine and CT scan shows a kidney infection on the right  No signs of appendicitis, gallbladder issue or kidney stone  You are given 1 dose of IV ceftriaxone in the emergency department  Start ciprofloxacin twice daily for 7 days  Fluid hydrate  Can continue Tylenol ibuprofen for pain  Follow-up with urology and your primary care provider, return if any acute worsening symptoms.  Please call urology to schedule.    You have been prescribed the antibiotic, Ciprofloxacin, and will need to take it for the full course as prescribed.    This medicine has been associated with rare cases of tendinitis (inflammation of your tendons) and tendon rupture. This risk increases if you are also taking steroids.  Please stop this medicine and call your primary care provider if you develop joint pain, muscle aches, difficulty walking, or swelling in your arms or legs.    Rare, but possible, side effects of this medication also include changes in mood, hallucinations, difficulty sleeping and confusion.    Avoid taking this medication with milk, dairy products, or calcium-fortified juices or within 2 hours of iron or calcium supplementation.  Use sunscreen and avoid excessive sun exposure while taking this medicine because you are at higher risk of sunburn.      If you are diabetic, this medication could increase or decrease your blood sugars.  Please monitor your blood sugar closely while on this antibiotic.

## 2023-07-10 NOTE — LETTER
July 10, 2023      To Whom It May Concern:      Jonatan Worley was seen in our Emergency Department today, 07/10/23.  I expect his condition to improve over the next *** days.  He may return to work/school when improved.    Sincerely,        Radha Prather RN

## 2023-07-11 LAB — BACTERIA UR CULT: ABNORMAL

## 2024-05-26 ENCOUNTER — HEALTH MAINTENANCE LETTER (OUTPATIENT)
Age: 34
End: 2024-05-26

## 2025-06-14 ENCOUNTER — HEALTH MAINTENANCE LETTER (OUTPATIENT)
Age: 35
End: 2025-06-14